# Patient Record
Sex: FEMALE | Race: WHITE | NOT HISPANIC OR LATINO | Employment: FULL TIME | URBAN - METROPOLITAN AREA
[De-identification: names, ages, dates, MRNs, and addresses within clinical notes are randomized per-mention and may not be internally consistent; named-entity substitution may affect disease eponyms.]

---

## 2022-08-23 PROBLEM — L03.114 CELLULITIS OF LEFT UPPER EXTREMITY: Status: ACTIVE | Noted: 2022-08-23

## 2022-08-23 PROBLEM — L92.3 TATTOO REACTION: Status: ACTIVE | Noted: 2022-08-23

## 2023-05-08 ENCOUNTER — HOSPITAL ENCOUNTER (EMERGENCY)
Facility: HOSPITAL | Age: 28
Discharge: HOME | End: 2023-05-08
Attending: EMERGENCY MEDICINE
Payer: MEDICAID

## 2023-05-08 VITALS
HEART RATE: 102 BPM | TEMPERATURE: 98 F | WEIGHT: 230 LBS | OXYGEN SATURATION: 100 % | BODY MASS INDEX: 42.33 KG/M2 | SYSTOLIC BLOOD PRESSURE: 108 MMHG | HEIGHT: 62 IN | DIASTOLIC BLOOD PRESSURE: 62 MMHG | RESPIRATION RATE: 18 BRPM

## 2023-05-08 DIAGNOSIS — E27.8 ADRENAL NODULE: ICD-10-CM

## 2023-05-08 DIAGNOSIS — K52.9 COLITIS: ICD-10-CM

## 2023-05-08 DIAGNOSIS — R10.84 GENERALIZED ABDOMINAL PAIN: Primary | ICD-10-CM

## 2023-05-08 LAB
ALBUMIN SERPL BCP-MCNC: 4 G/DL (ref 3.5–5.2)
ALP SERPL-CCNC: 85 U/L (ref 55–135)
ALT SERPL W/O P-5'-P-CCNC: 22 U/L (ref 10–44)
ANION GAP SERPL CALC-SCNC: 10 MMOL/L (ref 8–16)
AST SERPL-CCNC: 18 U/L (ref 10–40)
B-HCG UR QL: NEGATIVE
BACTERIA #/AREA URNS HPF: ABNORMAL /HPF
BASOPHILS # BLD AUTO: 0.01 K/UL (ref 0–0.2)
BASOPHILS NFR BLD: 0.1 % (ref 0–1.9)
BILIRUB SERPL-MCNC: 0.4 MG/DL (ref 0.1–1)
BILIRUB UR QL STRIP: NEGATIVE
BUN SERPL-MCNC: 15 MG/DL (ref 6–20)
CALCIUM SERPL-MCNC: 9.4 MG/DL (ref 8.7–10.5)
CHLORIDE SERPL-SCNC: 105 MMOL/L (ref 95–110)
CLARITY UR: ABNORMAL
CO2 SERPL-SCNC: 21 MMOL/L (ref 23–29)
COLOR UR: YELLOW
CREAT SERPL-MCNC: 0.9 MG/DL (ref 0.5–1.4)
DIFFERENTIAL METHOD: ABNORMAL
EOSINOPHIL # BLD AUTO: 0 K/UL (ref 0–0.5)
EOSINOPHIL NFR BLD: 0.2 % (ref 0–8)
ERYTHROCYTE [DISTWIDTH] IN BLOOD BY AUTOMATED COUNT: 12.6 % (ref 11.5–14.5)
EST. GFR  (NO RACE VARIABLE): >60 ML/MIN/1.73 M^2
GLUCOSE SERPL-MCNC: 100 MG/DL (ref 70–110)
GLUCOSE UR QL STRIP: NEGATIVE
HCT VFR BLD AUTO: 41.5 % (ref 37–48.5)
HCV AB SERPL QL IA: NORMAL
HGB BLD-MCNC: 13.5 G/DL (ref 12–16)
HGB UR QL STRIP: NEGATIVE
HYALINE CASTS #/AREA URNS LPF: 0 /LPF
IMM GRANULOCYTES # BLD AUTO: 0.04 K/UL (ref 0–0.04)
IMM GRANULOCYTES NFR BLD AUTO: 0.3 % (ref 0–0.5)
KETONES UR QL STRIP: NEGATIVE
LEUKOCYTE ESTERASE UR QL STRIP: NEGATIVE
LIPASE SERPL-CCNC: 34 U/L (ref 4–60)
LYMPHOCYTES # BLD AUTO: 1 K/UL (ref 1–4.8)
LYMPHOCYTES NFR BLD: 7.8 % (ref 18–48)
MCH RBC QN AUTO: 29.3 PG (ref 27–31)
MCHC RBC AUTO-ENTMCNC: 32.5 G/DL (ref 32–36)
MCV RBC AUTO: 90 FL (ref 82–98)
MICROSCOPIC COMMENT: ABNORMAL
MONOCYTES # BLD AUTO: 0.7 K/UL (ref 0.3–1)
MONOCYTES NFR BLD: 5.2 % (ref 4–15)
NEUTROPHILS # BLD AUTO: 11.2 K/UL (ref 1.8–7.7)
NEUTROPHILS NFR BLD: 86.4 % (ref 38–73)
NITRITE UR QL STRIP: NEGATIVE
NRBC BLD-RTO: 0 /100 WBC
PH UR STRIP: >8 [PH] (ref 5–8)
PLATELET # BLD AUTO: 226 K/UL (ref 150–450)
PMV BLD AUTO: 10.3 FL (ref 9.2–12.9)
POTASSIUM SERPL-SCNC: 4.1 MMOL/L (ref 3.5–5.1)
PROT SERPL-MCNC: 8 G/DL (ref 6–8.4)
PROT UR QL STRIP: ABNORMAL
RBC # BLD AUTO: 4.6 M/UL (ref 4–5.4)
RBC #/AREA URNS HPF: 0 /HPF (ref 0–4)
SODIUM SERPL-SCNC: 136 MMOL/L (ref 136–145)
SP GR UR STRIP: 1 (ref 1–1.03)
SQUAMOUS #/AREA URNS HPF: 5 /HPF
URN SPEC COLLECT METH UR: ABNORMAL
UROBILINOGEN UR STRIP-ACNC: NEGATIVE EU/DL
WBC # BLD AUTO: 12.95 K/UL (ref 3.9–12.7)
WBC #/AREA URNS HPF: 1 /HPF (ref 0–5)

## 2023-05-08 PROCEDURE — 99285 EMERGENCY DEPT VISIT HI MDM: CPT | Mod: 25

## 2023-05-08 PROCEDURE — 36415 COLL VENOUS BLD VENIPUNCTURE: CPT | Performed by: EMERGENCY MEDICINE

## 2023-05-08 PROCEDURE — 25000003 PHARM REV CODE 250: Performed by: EMERGENCY MEDICINE

## 2023-05-08 PROCEDURE — 83690 ASSAY OF LIPASE: CPT | Performed by: EMERGENCY MEDICINE

## 2023-05-08 PROCEDURE — 81000 URINALYSIS NONAUTO W/SCOPE: CPT | Performed by: EMERGENCY MEDICINE

## 2023-05-08 PROCEDURE — 85025 COMPLETE CBC W/AUTO DIFF WBC: CPT | Performed by: EMERGENCY MEDICINE

## 2023-05-08 PROCEDURE — 96361 HYDRATE IV INFUSION ADD-ON: CPT

## 2023-05-08 PROCEDURE — 80053 COMPREHEN METABOLIC PANEL: CPT | Performed by: EMERGENCY MEDICINE

## 2023-05-08 PROCEDURE — 87389 HIV-1 AG W/HIV-1&-2 AB AG IA: CPT | Performed by: EMERGENCY MEDICINE

## 2023-05-08 PROCEDURE — 86803 HEPATITIS C AB TEST: CPT | Performed by: EMERGENCY MEDICINE

## 2023-05-08 PROCEDURE — 96365 THER/PROPH/DIAG IV INF INIT: CPT | Mod: 59

## 2023-05-08 PROCEDURE — 25500020 PHARM REV CODE 255

## 2023-05-08 PROCEDURE — 81025 URINE PREGNANCY TEST: CPT | Performed by: EMERGENCY MEDICINE

## 2023-05-08 PROCEDURE — 63600175 PHARM REV CODE 636 W HCPCS: Performed by: EMERGENCY MEDICINE

## 2023-05-08 RX ORDER — ONDANSETRON 4 MG/1
4 TABLET, ORALLY DISINTEGRATING ORAL
Status: COMPLETED | OUTPATIENT
Start: 2023-05-08 | End: 2023-05-08

## 2023-05-08 RX ORDER — ACETAMINOPHEN 500 MG
1000 TABLET ORAL
Status: COMPLETED | OUTPATIENT
Start: 2023-05-08 | End: 2023-05-08

## 2023-05-08 RX ORDER — CIPROFLOXACIN 500 MG/1
500 TABLET ORAL 2 TIMES DAILY
Qty: 20 TABLET | Refills: 0 | Status: SHIPPED | OUTPATIENT
Start: 2023-05-08 | End: 2023-05-18

## 2023-05-08 RX ORDER — METRONIDAZOLE 500 MG/1
500 TABLET ORAL 3 TIMES DAILY
Qty: 21 TABLET | Refills: 0 | Status: SHIPPED | OUTPATIENT
Start: 2023-05-08 | End: 2023-05-15

## 2023-05-08 RX ORDER — CIPROFLOXACIN 2 MG/ML
400 INJECTION, SOLUTION INTRAVENOUS
Status: DISCONTINUED | OUTPATIENT
Start: 2023-05-08 | End: 2023-05-08

## 2023-05-08 RX ORDER — ONDANSETRON 4 MG/1
4 TABLET, ORALLY DISINTEGRATING ORAL EVERY 6 HOURS PRN
Qty: 12 TABLET | Refills: 0 | Status: SHIPPED | OUTPATIENT
Start: 2023-05-08 | End: 2023-12-01

## 2023-05-08 RX ADMIN — SODIUM CHLORIDE 1000 ML: 9 INJECTION, SOLUTION INTRAVENOUS at 05:05

## 2023-05-08 RX ADMIN — PIPERACILLIN AND TAZOBACTAM 4.5 G: 4; .5 INJECTION, POWDER, LYOPHILIZED, FOR SOLUTION INTRAVENOUS; PARENTERAL at 04:05

## 2023-05-08 RX ADMIN — SODIUM CHLORIDE 1000 ML: 9 INJECTION, SOLUTION INTRAVENOUS at 02:05

## 2023-05-08 RX ADMIN — ACETAMINOPHEN 1000 MG: 500 TABLET ORAL at 02:05

## 2023-05-08 RX ADMIN — IOHEXOL 100 ML: 350 INJECTION, SOLUTION INTRAVENOUS at 03:05

## 2023-05-08 RX ADMIN — ONDANSETRON 4 MG: 4 TABLET, ORALLY DISINTEGRATING ORAL at 07:05

## 2023-05-08 NOTE — DISCHARGE INSTRUCTIONS
Recommend you follow up for further outpatient management of adrenal nodule within 1 week with your PCP.

## 2023-05-08 NOTE — ED PROVIDER NOTES
Encounter Date: 2023       History     Chief Complaint   Patient presents with    Abdominal Pain     Started 2100 last night     Diarrhea    Chills     27-year-old female past medical history of hypertension presents today with lower abdominal pain and diarrhea x2 days.  Patient reports symptoms began last night with abdominal pain around 9:00 p.m..  She describes it as crampy lower middle abdominal pain.  Denies any dysuria hematuria.  Denies any blood in stool or black stools.  Denies any vomiting but reports some nausea.  She states she has some chills but denies any fever.  Denies any chest pain shortness breath cough runny nose congestion back pain or any other symptoms.  Patient states she is not pregnant.  Past surgical history of     The history is provided by the patient. No  was used.   Review of patient's allergies indicates:   Allergen Reactions    Nitrofurantoin monohyd/m-cryst      Past Medical History:   Diagnosis Date    Depression     Hypertension      Past Surgical History:   Procedure Laterality Date     SECTION      DILATION AND CURETTAGE OF UTERUS       No family history on file.  Social History     Tobacco Use    Smoking status: Never    Smokeless tobacco: Never   Substance Use Topics    Alcohol use: No    Drug use: No     Review of Systems   Constitutional:  Positive for chills. Negative for activity change, diaphoresis and fever.   HENT:  Negative for ear pain, rhinorrhea, sore throat and trouble swallowing.    Eyes:  Negative for pain and visual disturbance.   Respiratory:  Negative for cough, shortness of breath and stridor.    Cardiovascular:  Negative for chest pain.   Gastrointestinal:  Positive for abdominal pain, diarrhea and nausea. Negative for blood in stool and vomiting.   Genitourinary:  Negative for dysuria, hematuria, vaginal bleeding and vaginal discharge.   Musculoskeletal:  Negative for gait problem.   Skin:  Negative for rash and  wound.   Neurological:  Negative for seizures and headaches.   Psychiatric/Behavioral:  Negative for hallucinations and suicidal ideas.      Physical Exam     Initial Vitals [05/08/23 1309]   BP Pulse Resp Temp SpO2   (!) 145/90 (!) 118 20 99 °F (37.2 °C) 97 %      MAP       --         Physical Exam    Nursing note and vitals reviewed.  Constitutional: She appears well-developed. She is not diaphoretic. No distress.   HENT:   Head: Normocephalic and atraumatic.   Nose: Nose normal.   Eyes: EOM are normal. Pupils are equal, round, and reactive to light. No scleral icterus.   Neck: Neck supple.   Normal range of motion.  Cardiovascular:  Normal rate, regular rhythm, normal heart sounds and intact distal pulses.     Exam reveals no gallop and no friction rub.       No murmur heard.  Pulmonary/Chest: Breath sounds normal. No stridor. No respiratory distress. She has no wheezes. She has no rhonchi. She has no rales.   Abdominal: Abdomen is soft. Bowel sounds are normal. She exhibits no distension. There is no abdominal tenderness. There is no rebound and no guarding.   Musculoskeletal:         General: Normal range of motion.      Cervical back: Normal range of motion and neck supple.     Neurological: She is alert and oriented to person, place, and time. She has normal strength. No cranial nerve deficit or sensory deficit.   Skin: Skin is warm and dry. Capillary refill takes less than 2 seconds. No rash noted.   Psychiatric: She has a normal mood and affect.       ED Course   Procedures  Labs Reviewed   CBC W/ AUTO DIFFERENTIAL - Abnormal; Notable for the following components:       Result Value    WBC 12.95 (*)     Gran # (ANC) 11.2 (*)     Gran % 86.4 (*)     Lymph % 7.8 (*)     All other components within normal limits   COMPREHENSIVE METABOLIC PANEL - Abnormal; Notable for the following components:    CO2 21 (*)     All other components within normal limits   URINALYSIS, REFLEX TO URINE CULTURE - Abnormal; Notable  for the following components:    Appearance, UA Hazy (*)     pH, UA >8.0 (*)     Protein, UA 1+ (*)     All other components within normal limits    Narrative:     Specimen Source->Urine   URINALYSIS MICROSCOPIC - Abnormal; Notable for the following components:    Bacteria Few (*)     All other components within normal limits    Narrative:     Specimen Source->Urine   LIPASE   PREGNANCY TEST, URINE RAPID    Narrative:     Specimen Source->Urine   HIV 1 / 2 ANTIBODY   HEPATITIS C ANTIBODY          Imaging Results              CT Abdomen Pelvis With Contrast (Final result)  Result time 05/08/23 16:26:55      Final result by Mango Randolph MD (05/08/23 16:26:55)                   Impression:      1. There is apparent wall thickening of the right colon.  This could relate to inadequate distension.  Infectious or inflammatory colitis is also a consideration in the appropriate clinical setting.  2. Indeterminate left adrenal nodule.  This could be further characterized with elective adrenal mass protocol CT or MRI if warranted.  3. Heterogeneous density at several sites in the right hepatic lobe.  Steatosis is a consideration.      Electronically signed by: Mango Randolph  Date:    05/08/2023  Time:    16:26               Narrative:    EXAMINATION:  CT ABDOMEN PELVIS WITH CONTRAST    CLINICAL HISTORY:  Abdominal pain, acute, nonlocalized;    TECHNIQUE:  Low dose axial images, sagittal and coronal reformations were obtained from the lung bases to the pubic symphysis following the IV administration of 100 mL of Omnipaque 350 .  Oral contrast was not given.    COMPARISON:  None.    FINDINGS:  Lung bases are clear.  Normal size heart.  Unremarkable gallbladder.    There is a 2.3 cm left adrenal nodule Hounsfield unit 37.  There are subtle non masslike areas of decreased density at several sites in the right hepatic lobe.  These can be seen axial series 2, images 37 and 46.  Remaining solid abdominal organs are  unremarkable.    There is no enteric contrast which limits bowel assessment.  No dilated bowel loops.  There is apparent wall thickening along the ascending and proximal transverse colon segments.  Normal appendix.    Intrauterine device.  Unremarkable urinary bladder.    No acute osseous abnormality.                                       Medications   sodium chloride 0.9% bolus 1,000 mL 1,000 mL (0 mLs Intravenous Stopped 5/8/23 1526)   acetaminophen tablet 1,000 mg (1,000 mg Oral Given 5/8/23 1425)   iohexoL (OMNIPAQUE 350) 350 mg iodine/mL injection (100 mLs Intravenous Given 5/8/23 1537)   sodium chloride 0.9% bolus 1,000 mL 1,000 mL (0 mLs Intravenous Stopped 5/8/23 1820)   piperacillin-tazobactam (ZOSYN) 4.5 g in dextrose 5 % in water (D5W) 5 % 100 mL IVPB (MB+) (0 g Intravenous Stopped 5/8/23 1712)   ondansetron disintegrating tablet 4 mg (4 mg Oral Given 5/8/23 1924)     Medical Decision Making:   ED Management:  26 yo F presenting with lower abdominal pain consistent with prior diverticulitis or colitis. CT confirms colitis.  Do not suspect mesenteric ischemia.  I do not think emergent surgical intervention is indicated.  Pain is well controlled here.  Leukocytosis corresponding is consistent with inflammatory state.  There is no sign of abscess, perforation, or other acute complication.  Do not suspect C diff infection at this time given no recent antibiotics.  Joint decision making was made with patient and I will prescribe antibiotics for potential bacterial etiology. Discussed in detail black box warning and risks associated with fluoroquinolones.  Patient is aware of these risks and agrees to proceed with antibiotics.  We will plan on close outpatient PCP and GI follow-up for reassessment and I have review detailed return precautions. I also discussed all imaging results in detail with patient including her adrenal nodule which she plans to follow-up with closely palpation.             ED Course as of  05/08/23 2004   Mon May 08, 2023   1700 CT Abdomen Pelvis With Contrast [BD]   1701 CT Abdomen Pelvis With Contrast  Impression:     1. There is apparent wall thickening of the right colon.  This could relate to inadequate distension.  Infectious or inflammatory colitis is also a consideration in the appropriate clinical setting.  2. Indeterminate left adrenal nodule.  This could be further characterized with elective adrenal mass protocol CT or MRI if warranted.  3. Heterogeneous density at several sites in the right hepatic lobe.  Steatosis is a consideration.        Electronically signed by: Mango Randolph  Date:                                            05/08/2023  Time:                                           16:26 [BD]      ED Course User Index  [BD] Sedrick Berrios MD                 Clinical Impression:   Final diagnoses:  [R10.84] Generalized abdominal pain (Primary)  [E27.8] Adrenal nodule  [K52.9] Colitis        ED Disposition Condition    Discharge Stable          ED Prescriptions       Medication Sig Dispense Start Date End Date Auth. Provider    ciprofloxacin HCl (CIPRO) 500 MG tablet Take 1 tablet (500 mg total) by mouth 2 (two) times daily. for 10 days 20 tablet 5/8/2023 5/18/2023 Sedrick Berrios MD    metroNIDAZOLE (FLAGYL) 500 MG tablet Take 1 tablet (500 mg total) by mouth 3 (three) times daily. for 7 days 21 tablet 5/8/2023 5/15/2023 Sedrick Berrios MD    ondansetron (ZOFRAN-ODT) 4 MG TbDL Take 1 tablet (4 mg total) by mouth every 6 (six) hours as needed (nausea). 12 tablet 5/8/2023 -- Sedrick Berrios MD          Follow-up Information       Follow up With Specialties Details Why Contact Info    Melissa Gracia NP Family Medicine Go in 2 days  [address]  [facility] [address]  [phone]      [facility] - Emergency Dept Emergency Medicine Go to  As needed, If symptoms worsen [address]  [facility] [id number] [phone]             Sedrick Berrios MD  05/08/23  2004

## 2023-05-09 LAB — HIV 1+2 AB+HIV1 P24 AG SERPL QL IA: NORMAL

## 2023-05-27 ENCOUNTER — HOSPITAL ENCOUNTER (OUTPATIENT)
Facility: HOSPITAL | Age: 28
Discharge: HOME | DRG: 388 | End: 2023-05-29
Attending: INTERNAL MEDICINE | Admitting: INTERNAL MEDICINE
Payer: MEDICAID

## 2023-05-27 DIAGNOSIS — R07.9 CHEST PAIN: ICD-10-CM

## 2023-05-27 DIAGNOSIS — K56.600 PARTIAL SMALL BOWEL OBSTRUCTION: ICD-10-CM

## 2023-05-27 PROCEDURE — C9113 INJ PANTOPRAZOLE SODIUM, VIA: HCPCS | Performed by: INTERNAL MEDICINE

## 2023-05-27 PROCEDURE — G0379 DIRECT REFER HOSPITAL OBSERV: HCPCS

## 2023-05-27 PROCEDURE — 96367 TX/PROPH/DG ADDL SEQ IV INF: CPT

## 2023-05-27 PROCEDURE — G0378 HOSPITAL OBSERVATION PER HR: HCPCS

## 2023-05-27 PROCEDURE — 96365 THER/PROPH/DIAG IV INF INIT: CPT

## 2023-05-27 PROCEDURE — 12000002 HC ACUTE/MED SURGE SEMI-PRIVATE ROOM

## 2023-05-27 PROCEDURE — 96375 TX/PRO/DX INJ NEW DRUG ADDON: CPT

## 2023-05-27 PROCEDURE — 63600175 PHARM REV CODE 636 W HCPCS: Performed by: INTERNAL MEDICINE

## 2023-05-27 PROCEDURE — 96366 THER/PROPH/DIAG IV INF ADDON: CPT

## 2023-05-27 PROCEDURE — 25000003 PHARM REV CODE 250: Performed by: INTERNAL MEDICINE

## 2023-05-27 RX ORDER — HEPARIN SODIUM 5000 [USP'U]/ML
5000 INJECTION, SOLUTION INTRAVENOUS; SUBCUTANEOUS EVERY 8 HOURS
Status: DISCONTINUED | OUTPATIENT
Start: 2023-05-27 | End: 2023-05-29 | Stop reason: HOSPADM

## 2023-05-27 RX ORDER — SODIUM CHLORIDE 9 MG/ML
INJECTION, SOLUTION INTRAVENOUS CONTINUOUS
Status: DISCONTINUED | OUTPATIENT
Start: 2023-05-27 | End: 2023-05-27

## 2023-05-27 RX ORDER — IBUPROFEN 200 MG
24 TABLET ORAL
Status: DISCONTINUED | OUTPATIENT
Start: 2023-05-27 | End: 2023-05-29 | Stop reason: HOSPADM

## 2023-05-27 RX ORDER — ONDANSETRON 2 MG/ML
4 INJECTION INTRAMUSCULAR; INTRAVENOUS EVERY 4 HOURS PRN
Status: DISCONTINUED | OUTPATIENT
Start: 2023-05-27 | End: 2023-05-29 | Stop reason: HOSPADM

## 2023-05-27 RX ORDER — NALOXONE HCL 0.4 MG/ML
0.02 VIAL (ML) INJECTION
Status: DISCONTINUED | OUTPATIENT
Start: 2023-05-27 | End: 2023-05-29 | Stop reason: HOSPADM

## 2023-05-27 RX ORDER — PANTOPRAZOLE SODIUM 40 MG/10ML
40 INJECTION, POWDER, LYOPHILIZED, FOR SOLUTION INTRAVENOUS DAILY
Status: DISCONTINUED | OUTPATIENT
Start: 2023-05-27 | End: 2023-05-29 | Stop reason: HOSPADM

## 2023-05-27 RX ORDER — SODIUM CHLORIDE 0.9 % (FLUSH) 0.9 %
10 SYRINGE (ML) INJECTION EVERY 12 HOURS PRN
Status: DISCONTINUED | OUTPATIENT
Start: 2023-05-27 | End: 2023-05-29 | Stop reason: HOSPADM

## 2023-05-27 RX ORDER — HYDROMORPHONE HYDROCHLORIDE 1 MG/ML
0.5 INJECTION, SOLUTION INTRAMUSCULAR; INTRAVENOUS; SUBCUTANEOUS EVERY 6 HOURS PRN
Status: DISCONTINUED | OUTPATIENT
Start: 2023-05-27 | End: 2023-05-29 | Stop reason: HOSPADM

## 2023-05-27 RX ORDER — MAGNESIUM SULFATE 1 G/100ML
1 INJECTION INTRAVENOUS ONCE
Status: COMPLETED | OUTPATIENT
Start: 2023-05-27 | End: 2023-05-27

## 2023-05-27 RX ORDER — HYDROMORPHONE HYDROCHLORIDE 1 MG/ML
0.2 INJECTION, SOLUTION INTRAMUSCULAR; INTRAVENOUS; SUBCUTANEOUS EVERY 6 HOURS PRN
Status: DISCONTINUED | OUTPATIENT
Start: 2023-05-27 | End: 2023-05-29 | Stop reason: HOSPADM

## 2023-05-27 RX ORDER — GLUCAGON 1 MG
1 KIT INJECTION
Status: DISCONTINUED | OUTPATIENT
Start: 2023-05-27 | End: 2023-05-29 | Stop reason: HOSPADM

## 2023-05-27 RX ORDER — IBUPROFEN 200 MG
16 TABLET ORAL
Status: DISCONTINUED | OUTPATIENT
Start: 2023-05-27 | End: 2023-05-29 | Stop reason: HOSPADM

## 2023-05-27 RX ORDER — DEXTROSE, SODIUM CHLORIDE, SODIUM LACTATE, POTASSIUM CHLORIDE, AND CALCIUM CHLORIDE 5; .6; .31; .03; .02 G/100ML; G/100ML; G/100ML; G/100ML; G/100ML
INJECTION, SOLUTION INTRAVENOUS CONTINUOUS
Status: DISCONTINUED | OUTPATIENT
Start: 2023-05-27 | End: 2023-05-28

## 2023-05-27 RX ADMIN — PIPERACILLIN SODIUM AND TAZOBACTAM SODIUM 3.38 G: 3; .375 INJECTION, POWDER, LYOPHILIZED, FOR SOLUTION INTRAVENOUS at 08:05

## 2023-05-27 RX ADMIN — HYDROMORPHONE HYDROCHLORIDE 0.2 MG: 0.5 INJECTION, SOLUTION INTRAMUSCULAR; INTRAVENOUS; SUBCUTANEOUS at 08:05

## 2023-05-27 RX ADMIN — DEXTROSE, SODIUM CHLORIDE, SODIUM LACTATE, POTASSIUM CHLORIDE, AND CALCIUM CHLORIDE: 5; .6; .31; .03; .02 INJECTION, SOLUTION INTRAVENOUS at 05:05

## 2023-05-27 RX ADMIN — PANTOPRAZOLE SODIUM 40 MG: 40 INJECTION, POWDER, FOR SOLUTION INTRAVENOUS at 05:05

## 2023-05-27 RX ADMIN — MAGNESIUM SULFATE 1 G: 1 INJECTION INTRAVENOUS at 05:05

## 2023-05-27 NOTE — H&P
[facility]    History & Physical      Patient Name: Aisha Escobedo  MRN: [id number]  Admission Date: 5/27/2023  Attending Physician: Onur Mendez MD   Primary Care Provider: Melissa Gracia NP         Patient information was obtained from patient, past medical records, and ER records.     Subjective:     Principal Problem:Partial small bowel obstruction    Chief Complaint: No chief complaint on file.       HPI: 27-year-old female with a history of hypertension and depression previously seen in the [facility] Emergency Department on May 8 with abdominal pain, diarrhea, and chills.  Findings during that stay were consistent with colitis, and she was discharged with oral antibiotics.  Symptoms resolved, but she subsequently presented back to the [facility] Emergency Department on May 27 with mid to lower abdominal pain, fever, and nonbloody diarrhea.  No vomiting noted.  She was febrile, tachycardic, and had low blood pressure during the early part of her emergency department stay.  She received boluses of normal saline with improvement in heart rate and blood pressure.  On exam she was awake and alert with no respiratory symptoms.  She had mild generalized abdominal tenderness to palpation.  She was COVID positive in the emergency department.  CT of the abdomen and pelvis showed evidence of possible ileus versus small-bowel obstruction, but the previously noted colon wall thickening appeared resolved.  She had no vomiting in the emergency department.  She received IV Zosyn.  Requesting transfer to Hospital Medicine at [facility] in COVID isolation status for evaluation of ileus/small-bowel obstruction along with acute febrile illness. Blood pressure fluctuated during her ED stay. With boluses of NS, BP improved.     COVID positive, influenza negative  White blood cells 10.78, hemoglobin 12.3, hematocrit 39.1, platelets 229, lactic acid  1, INR 1, magnesium 1.8, lipase 27, urine pregnancy test negative, sodium 135, potassium 4.3, chloride 102, CO2 25, BUN 14, creatinine 1, glucose 104, AST 20, ALT 28  Urinalysis with trace occult blood, 2+ leukocytes, 1 RBC, 6, WBC, rare bacteria, 8 squamous epithelial cells  Blood cultures ordered     CT abdomen and pelvis showed dilatation of the small bowel of the left upper quadrant up to 3.6 cm with a transition and normal small bowel in the upper mid abdomen.  Constellation of findings nonspecific and could represent focal ileus versus early/partial small bowel obstruction.  Interval resolution of the colonic wall thickening seen on May 8.  Indeterminate left adrenal nodule.     EKG showed sinus tachycardia with ventricular rate 110.  Otherwise normal ECG.     May 8: CT abdomen and pelvis noted apparent wall thickening of the right colon.  Indeterminate left adrenal nodule.  Heterogeneous density at several sites in the right hepatic lobe.  Steatosis is a consideration.    Patient transferred to [facility] for availability of surgery, GI.    Personally complaining of some abdominal pain, asking to eat.    Past Medical History:   Diagnosis Date    colitis     Depression     Hypertension        Past Surgical History:   Procedure Laterality Date     SECTION      DILATION AND CURETTAGE OF UTERUS         Review of patient's allergies indicates:   Allergen Reactions    Nitrofurantoin monohyd/m-cryst        Current Facility-Administered Medications on File Prior to Encounter   Medication    [COMPLETED] 0.9%  NaCl infusion    [COMPLETED] acetaminophen tablet 1,000 mg    [COMPLETED] aluminum-magnesium hydroxide-simethicone 200-200-20 mg/5 mL suspension 30 mL    [COMPLETED] dicyclomine injection 20 mg    [COMPLETED] iohexoL (OMNIPAQUE 350) injection 100 mL    [COMPLETED] morphine injection 4 mg    [COMPLETED] ondansetron injection 8 mg    [COMPLETED] piperacillin-tazobactam (ZOSYN) 4.5 g in dextrose 5 % in water  (D5W) 5 % 100 mL IVPB (MB+)    [COMPLETED] remdesivir 200 mg in sodium chloride 0.9% 250 mL infusion    [COMPLETED] sodium chloride 0.9% bolus 1,000 mL 1,000 mL    [COMPLETED] sodium chloride 0.9% bolus 1,000 mL 1,000 mL    [COMPLETED] sodium chloride 0.9% bolus 1,503 mL 1,503 mL    [DISCONTINUED] LIDOcaine HCl 2% oral solution 15 mL    [DISCONTINUED] remdesivir 100 mg in sodium chloride 0.9% 250 mL infusion     Current Outpatient Medications on File Prior to Encounter   Medication Sig    butalbital-acetaminophen-caffeine -40 mg (FIORICET, ESGIC) -40 mg per tablet Take 1 tablet by mouth every 4 (four) hours as needed.    cetirizine (ZYRTEC) 10 MG tablet Take 1 tablet (10 mg total) by mouth once daily.    diclofenac (VOLTAREN) 75 MG EC tablet Take 1 tablet (75 mg total) by mouth 2 (two) times daily.    escitalopram oxalate (LEXAPRO) 20 MG tablet Take 20 mg by mouth once daily.    HYDROcodone-acetaminophen (NORCO) 5-325 mg per tablet Take 1 tablet by mouth every 4 (four) hours as needed for Pain.    lisinopril (PRINIVIL,ZESTRIL) 40 MG tablet lisinopril 40 mg tablet   Take 1 tablet every day by oral route.    ondansetron (ZOFRAN) 4 MG tablet Take 1 tablet (4 mg total) by mouth every 6 (six) hours.    ondansetron (ZOFRAN-ODT) 4 MG TbDL Take 1 tablet (4 mg total) by mouth every 6 (six) hours as needed (nausea).    promethazine (PHENERGAN) 25 MG tablet Take 1 tablet (25 mg total) by mouth every 6 (six) hours as needed for Nausea.     Family History    None       Tobacco Use    Smoking status: Never    Smokeless tobacco: Never   Substance and Sexual Activity    Alcohol use: No    Drug use: No    Sexual activity: Not on file     Review of Systems  Objective:     Vital Signs (Most Recent):    Vital Signs (24h Range):  Temp:  [97.5 °F (36.4 °C)-102.8 °F (39.3 °C)] 97.5 °F (36.4 °C)  Pulse:  [] 87  Resp:  [18-27] 19  SpO2:  [96 %-100 %] 100 %  BP: ()/(50-78) 100/57        There is no height or weight on  file to calculate BMI.    Physical Exam  Constitutional:       Appearance: Normal appearance.   HENT:      Head: Normocephalic.      Right Ear: External ear normal.      Left Ear: External ear normal.      Nose: Nose normal.      Mouth/Throat:      Mouth: Mucous membranes are moist.   Eyes:      Extraocular Movements: Extraocular movements intact.      Pupils: Pupils are equal, round, and reactive to light.   Cardiovascular:      Rate and Rhythm: Normal rate and regular rhythm.   Pulmonary:      Effort: Pulmonary effort is normal.      Breath sounds: Normal breath sounds.   Abdominal:      Tenderness: There is abdominal tenderness. There is no guarding or rebound.      Comments: Mild-moderately tender diffusely, mostly at the left and right abdominal areas as well as epigastrium   Skin:     General: Skin is warm.      Capillary Refill: Capillary refill takes less than 2 seconds.   Neurological:      General: No focal deficit present.      Mental Status: She is alert and oriented to person, place, and time.   Psychiatric:         Mood and Affect: Mood normal.         Behavior: Behavior normal.         CRANIAL NERVES     CN III, IV, VI   Pupils are equal, round, and reactive to light.    Significant Labs: All pertinent labs within the past 24 hours have been reviewed.    Significant Imaging: I have reviewed all pertinent imaging results/findings within the past 24 hours.    Assessment/Plan:     Active Diagnoses:    Diagnosis Date Noted POA    PRINCIPAL PROBLEM:  Partial small bowel obstruction [K56.600] 05/27/2023 Unknown      Problems Resolved During this Admission:     VTE Risk Mitigation (From admission, onward)           Ordered     heparin (porcine) injection 5,000 Units  Every 8 hours         05/27/23 1453     IP VTE HIGH RISK PATIENT  Once         05/27/23 1453     Place sequential compression device  Until discontinued         05/27/23 1453                  Abdominal pain, diarrhea, chills  Likely infectious  colitis, possibly purely related to COVID in unvaccinated pt  Hypotension/dehydration  -received remdesivir 1 time dose in the ER at, will continue   -f/u stool and Bcx, UCx  -serial abd exams  -empiric zosyn  -NPO  -D5NS  -pain control, antiemetics  -KUB in a.m.  -consider surgery consult if not improving  -HSQ, PPI     left adrenal nodule-OP f/u    Onur Mendez MD  Department of Hospital Medicine   [facility]

## 2023-05-28 LAB
ANION GAP SERPL CALC-SCNC: 6 MMOL/L (ref 8–16)
BASOPHILS # BLD AUTO: 0.01 K/UL (ref 0–0.2)
BASOPHILS NFR BLD: 0.2 % (ref 0–1.9)
BUN SERPL-MCNC: 9 MG/DL (ref 6–20)
CALCIUM SERPL-MCNC: 7.7 MG/DL (ref 8.7–10.5)
CHLORIDE SERPL-SCNC: 106 MMOL/L (ref 95–110)
CO2 SERPL-SCNC: 24 MMOL/L (ref 23–29)
CREAT SERPL-MCNC: 0.9 MG/DL (ref 0.5–1.4)
DIFFERENTIAL METHOD: ABNORMAL
EOSINOPHIL # BLD AUTO: 0 K/UL (ref 0–0.5)
EOSINOPHIL NFR BLD: 0.5 % (ref 0–8)
ERYTHROCYTE [DISTWIDTH] IN BLOOD BY AUTOMATED COUNT: 13.1 % (ref 11.5–14.5)
EST. GFR  (NO RACE VARIABLE): >60 ML/MIN/1.73 M^2
GLUCOSE SERPL-MCNC: 100 MG/DL (ref 70–110)
HCT VFR BLD AUTO: 33 % (ref 37–48.5)
HGB BLD-MCNC: 10.6 G/DL (ref 12–16)
IMM GRANULOCYTES # BLD AUTO: 0.03 K/UL (ref 0–0.04)
IMM GRANULOCYTES NFR BLD AUTO: 0.5 % (ref 0–0.5)
LYMPHOCYTES # BLD AUTO: 1.5 K/UL (ref 1–4.8)
LYMPHOCYTES NFR BLD: 22.3 % (ref 18–48)
MAGNESIUM SERPL-MCNC: 2 MG/DL (ref 1.6–2.6)
MCH RBC QN AUTO: 29.1 PG (ref 27–31)
MCHC RBC AUTO-ENTMCNC: 32.1 G/DL (ref 32–36)
MCV RBC AUTO: 91 FL (ref 82–98)
MONOCYTES # BLD AUTO: 0.6 K/UL (ref 0.3–1)
MONOCYTES NFR BLD: 9.6 % (ref 4–15)
NEUTROPHILS # BLD AUTO: 4.4 K/UL (ref 1.8–7.7)
NEUTROPHILS NFR BLD: 66.9 % (ref 38–73)
NRBC BLD-RTO: 0 /100 WBC
PLATELET # BLD AUTO: 198 K/UL (ref 150–450)
PMV BLD AUTO: 10.8 FL (ref 9.2–12.9)
POTASSIUM SERPL-SCNC: 3.8 MMOL/L (ref 3.5–5.1)
RBC # BLD AUTO: 3.64 M/UL (ref 4–5.4)
SODIUM SERPL-SCNC: 136 MMOL/L (ref 136–145)
WBC # BLD AUTO: 6.55 K/UL (ref 3.9–12.7)

## 2023-05-28 PROCEDURE — 96366 THER/PROPH/DIAG IV INF ADDON: CPT

## 2023-05-28 PROCEDURE — 63600175 PHARM REV CODE 636 W HCPCS: Performed by: INTERNAL MEDICINE

## 2023-05-28 PROCEDURE — 96372 THER/PROPH/DIAG INJ SC/IM: CPT | Performed by: INTERNAL MEDICINE

## 2023-05-28 PROCEDURE — C9113 INJ PANTOPRAZOLE SODIUM, VIA: HCPCS | Performed by: INTERNAL MEDICINE

## 2023-05-28 PROCEDURE — 25000003 PHARM REV CODE 250: Performed by: INTERNAL MEDICINE

## 2023-05-28 PROCEDURE — 96367 TX/PROPH/DG ADDL SEQ IV INF: CPT

## 2023-05-28 PROCEDURE — 96376 TX/PRO/DX INJ SAME DRUG ADON: CPT

## 2023-05-28 PROCEDURE — 12000002 HC ACUTE/MED SURGE SEMI-PRIVATE ROOM

## 2023-05-28 PROCEDURE — 83735 ASSAY OF MAGNESIUM: CPT | Performed by: INTERNAL MEDICINE

## 2023-05-28 PROCEDURE — G0378 HOSPITAL OBSERVATION PER HR: HCPCS

## 2023-05-28 PROCEDURE — 85025 COMPLETE CBC W/AUTO DIFF WBC: CPT | Performed by: INTERNAL MEDICINE

## 2023-05-28 PROCEDURE — 80048 BASIC METABOLIC PNL TOTAL CA: CPT | Performed by: INTERNAL MEDICINE

## 2023-05-28 PROCEDURE — 36415 COLL VENOUS BLD VENIPUNCTURE: CPT | Performed by: INTERNAL MEDICINE

## 2023-05-28 PROCEDURE — 96375 TX/PRO/DX INJ NEW DRUG ADDON: CPT

## 2023-05-28 RX ORDER — SODIUM CHLORIDE, SODIUM LACTATE, POTASSIUM CHLORIDE, CALCIUM CHLORIDE 600; 310; 30; 20 MG/100ML; MG/100ML; MG/100ML; MG/100ML
INJECTION, SOLUTION INTRAVENOUS CONTINUOUS
Status: DISCONTINUED | OUTPATIENT
Start: 2023-05-28 | End: 2023-05-29 | Stop reason: HOSPADM

## 2023-05-28 RX ADMIN — PIPERACILLIN SODIUM AND TAZOBACTAM SODIUM 3.38 G: 3; .375 INJECTION, POWDER, LYOPHILIZED, FOR SOLUTION INTRAVENOUS at 09:05

## 2023-05-28 RX ADMIN — DEXTROSE, SODIUM CHLORIDE, SODIUM LACTATE, POTASSIUM CHLORIDE, AND CALCIUM CHLORIDE: 5; .6; .31; .03; .02 INJECTION, SOLUTION INTRAVENOUS at 08:05

## 2023-05-28 RX ADMIN — PIPERACILLIN SODIUM AND TAZOBACTAM SODIUM 3.38 G: 3; .375 INJECTION, POWDER, LYOPHILIZED, FOR SOLUTION INTRAVENOUS at 12:05

## 2023-05-28 RX ADMIN — REMDESIVIR 100 MG: 100 INJECTION, POWDER, LYOPHILIZED, FOR SOLUTION INTRAVENOUS at 05:05

## 2023-05-28 RX ADMIN — PIPERACILLIN SODIUM AND TAZOBACTAM SODIUM 3.38 G: 3; .375 INJECTION, POWDER, LYOPHILIZED, FOR SOLUTION INTRAVENOUS at 05:05

## 2023-05-28 RX ADMIN — SODIUM CHLORIDE, SODIUM LACTATE, POTASSIUM CHLORIDE, AND CALCIUM CHLORIDE: .6; .31; .03; .02 INJECTION, SOLUTION INTRAVENOUS at 01:05

## 2023-05-28 RX ADMIN — HEPARIN SODIUM 5000 UNITS: 5000 INJECTION, SOLUTION INTRAVENOUS; SUBCUTANEOUS at 01:05

## 2023-05-28 RX ADMIN — PANTOPRAZOLE SODIUM 40 MG: 40 INJECTION, POWDER, FOR SOLUTION INTRAVENOUS at 05:05

## 2023-05-28 NOTE — PLAN OF CARE
Problem: Adult Inpatient Plan of Care  Goal: Patient-Specific Goal (Individualized)  Outcome: Ongoing, Progressing  Goal: Absence of Hospital-Acquired Illness or Injury  Outcome: Ongoing, Progressing  Goal: Optimal Comfort and Wellbeing  Outcome: Ongoing, Progressing     Problem: Infection  Goal: Absence of Infection Signs and Symptoms  Outcome: Ongoing, Progressing     Problem: Activity Intolerance (Pulmonary Impairment)  Goal: Improved Activity Tolerance  Outcome: Ongoing, Progressing     Problem: Airway Clearance Ineffective (Pulmonary Impairment)  Goal: Effective Airway Clearance  Outcome: Ongoing, Progressing     Problem: Gas Exchange Impaired (Pulmonary Impairment)  Goal: Optimal Gas Exchange  Outcome: Ongoing, Progressing

## 2023-05-28 NOTE — PROGRESS NOTES
[facility]  Progress Note    Patient Name: Aisha Escobedo  MRN: [id number]  Patient Class: IP- Inpatient   Admission Date: 5/27/2023  Length of Stay: 1 days  Attending Physician: Onur Mendez MD  Primary Care Provider: Melissa Gracia NP        Subjective:     Principal Problem:Partial small bowel obstruction    Interval History:  Some abdominal discomfort, SBO on KUB, has bowel sounds.  Symptomatically improving.  Advance to full liquid diet.    Review of Systems  Objective:     Vital Signs (Most Recent):  Temp: 97.6 °F (36.4 °C) (05/28/23 1151)  Pulse: 85 (05/28/23 1151)  Resp: 18 (05/28/23 1151)  BP: 99/62 (05/28/23 1151)  SpO2: 100 % (05/28/23 1151) Vital Signs (24h Range):  Temp:  [97.1 °F (36.2 °C)-98.8 °F (37.1 °C)] 97.6 °F (36.4 °C)  Pulse:  [76-92] 85  Resp:  [16-19] 18  SpO2:  [97 %-100 %] 100 %  BP: ()/(51-81) 99/62     Weight: 102.1 kg (225 lb)  Body mass index is 41.15 kg/m².    Intake/Output Summary (Last 24 hours) at 5/28/2023 1317  Last data filed at 5/27/2023 2041  Gross per 24 hour   Intake 400 ml   Output --   Net 400 ml      Physical Exam  Physical Exam  Constitutional:       Appearance: Normal appearance.   HENT:      Head: Normocephalic.      Right Ear: External ear normal.      Left Ear: External ear normal.      Nose: Nose normal.      Mouth/Throat:      Mouth: Mucous membranes are moist.   Eyes:      Extraocular Movements: Extraocular movements intact.      Pupils: Pupils are equal, round, and reactive to light.   Cardiovascular:      Rate and Rhythm: Normal rate and regular rhythm.   Pulmonary:      Effort: Pulmonary effort is normal.      Breath sounds: Normal breath sounds.   Abdominal:      Tenderness: There is abdominal tenderness. There is no guarding or rebound.      Comments: Mildly tender diffusely, mostly at the left and right abdominal areas as well as epigastrium   Skin:     General: Skin is warm.      Capillary  Refill: Capillary refill takes less than 2 seconds.   Neurological:      General: No focal deficit present.      Mental Status: She is alert and oriented to person, place, and time.   Psychiatric:         Mood and Affect: Mood normal.         Behavior: Behavior normal.         Significant Labs: All pertinent labs within the past 24 hours have been reviewed.    Significant Imaging: I have reviewed all pertinent imaging results/findings within the past 24 hours.    Assessment/Plan:      Active Diagnoses:    Diagnosis Date Noted POA    PRINCIPAL PROBLEM:  Partial small bowel obstruction [K56.600] 05/27/2023 Yes      Problems Resolved During this Admission:     VTE Risk Mitigation (From admission, onward)           Ordered     heparin (porcine) injection 5,000 Units  Every 8 hours         05/27/23 1453     IP VTE HIGH RISK PATIENT  Once         05/27/23 1453     Place sequential compression device  Until discontinued         05/27/23 1453                            Abdominal pain, diarrhea, chills  Likely infectious colitis, possibly purely related to COVID in unvaccinated pt  Hypotension/dehydration  -received remdesivir 1 time dose in the ER at, will continue   -f/u stool and Bcx, UCx  -serial abd exams  -empiric zosyn  -NPO  -D5NS --> LR  -advance to FLD  -pain control, antiemetics  -consider surgery consult if not improving  -HSQ, PPI      left adrenal nodule-OP f/u       Onur Mendez MD  Department of Hospital Medicine   [facility]

## 2023-05-28 NOTE — NURSING
0700: report received, in bed sleeping, no distress  1815: uneventful shift, had 2 small bowel movements, tolerating diet and advanced to soft, pleasant and cooperative.

## 2023-05-28 NOTE — PLAN OF CARE
[facility]  Initial Discharge Assessment       Primary Care Provider: Melissa Gracia NP    Admission Diagnosis: Partial small bowel obstruction [K56.600]    Admission Date: 5/27/2023  Expected Discharge Date:     Transition of Care Barriers: None    Payor: MEDICAID / Plan: [facility] COMMUNITY PLAN [employer] ([place] MEDICAID) / Product Type: Managed Medicaid /     Extended Emergency Contact Information  Primary Emergency Contact: GreggMorganKristine  Address: [address] [place] [address] [place] [address] [facility]  Home Phone: [phone]  Mobile Phone: [phone]  Relation: Mother    Discharge Plan A: Home  Discharge Plan B: Home      [facility] - [place], [place] - [address]  [address] [address]  Phone: [phone] Fax: [phone]    CM met with Pt at bedside to complete discharge assessment. Pt AAOx4s. Demographics, PCP, and insurance verified. No home health. No dialysis. Pt reports ability to complete ADLs without assistance. Pt verbalized plan to discharge home via family transport. Pt has no other needs to be addressed at this time.     Initial Assessment (most recent)       Adult Discharge Assessment - 05/28/23 1418          Discharge Assessment    Assessment Type Discharge Planning Assessment     Confirmed/corrected address, phone number and insurance Yes     Confirmed Demographics Correct on Facesheet     Source of Information patient     When was your last doctors appointment? --   1 mo ago    Communicated NGA with patient/caregiver Date not available/Unable to determine     Reason For Admission Abdominal Pain, Diarrhea     People in Home child(jimmy), dependent     Do you expect to return to your current living situation? Yes     Do you have help at home or someone to help you manage your care at home? Yes     Who are your caregiver(s) and their phone number(s)? Kristine Escobedo (mother) [phone]     Prior to hospitilization  cognitive status: Alert/Oriented     Current cognitive status: Alert/Oriented     Home Layout Able to live on 1st floor     Equipment Currently Used at Home none     Readmission within 30 days? No     Patient currently being followed by outpatient case management? No     Do you currently have service(s) that help you manage your care at home? No     Do you take prescription medications? Yes     Do you have prescription coverage? Yes     Coverage Critical access hospital Care     Do you have any problems affording any of your prescribed medications? No     Is the patient taking medications as prescribed? yes     Who is going to help you get home at discharge? Family     How do you get to doctors appointments? car, drives self;family or friend will provide     Are you on dialysis? No     Do you take coumadin? No     Discharge Plan A Home     Discharge Plan B Home     DME Needed Upon Discharge  none     Discharge Plan discussed with: Patient     Transition of Care Barriers None

## 2023-05-29 VITALS
HEART RATE: 75 BPM | WEIGHT: 225 LBS | RESPIRATION RATE: 18 BRPM | SYSTOLIC BLOOD PRESSURE: 116 MMHG | TEMPERATURE: 98 F | HEIGHT: 62 IN | BODY MASS INDEX: 41.41 KG/M2 | DIASTOLIC BLOOD PRESSURE: 78 MMHG | OXYGEN SATURATION: 96 %

## 2023-05-29 LAB
ANION GAP SERPL CALC-SCNC: 6 MMOL/L (ref 8–16)
BASOPHILS # BLD AUTO: 0.01 K/UL (ref 0–0.2)
BASOPHILS NFR BLD: 0.2 % (ref 0–1.9)
BUN SERPL-MCNC: 9 MG/DL (ref 6–20)
CALCIUM SERPL-MCNC: 8.5 MG/DL (ref 8.7–10.5)
CHLORIDE SERPL-SCNC: 106 MMOL/L (ref 95–110)
CO2 SERPL-SCNC: 26 MMOL/L (ref 23–29)
CREAT SERPL-MCNC: 0.9 MG/DL (ref 0.5–1.4)
DIFFERENTIAL METHOD: ABNORMAL
EOSINOPHIL # BLD AUTO: 0.1 K/UL (ref 0–0.5)
EOSINOPHIL NFR BLD: 2 % (ref 0–8)
ERYTHROCYTE [DISTWIDTH] IN BLOOD BY AUTOMATED COUNT: 12.9 % (ref 11.5–14.5)
EST. GFR  (NO RACE VARIABLE): >60 ML/MIN/1.73 M^2
GLUCOSE SERPL-MCNC: 95 MG/DL (ref 70–110)
HCT VFR BLD AUTO: 35.4 % (ref 37–48.5)
HGB BLD-MCNC: 11.3 G/DL (ref 12–16)
IMM GRANULOCYTES # BLD AUTO: 0.02 K/UL (ref 0–0.04)
IMM GRANULOCYTES NFR BLD AUTO: 0.4 % (ref 0–0.5)
LYMPHOCYTES # BLD AUTO: 1.5 K/UL (ref 1–4.8)
LYMPHOCYTES NFR BLD: 33.8 % (ref 18–48)
MAGNESIUM SERPL-MCNC: 2 MG/DL (ref 1.6–2.6)
MCH RBC QN AUTO: 28.8 PG (ref 27–31)
MCHC RBC AUTO-ENTMCNC: 31.9 G/DL (ref 32–36)
MCV RBC AUTO: 90 FL (ref 82–98)
MONOCYTES # BLD AUTO: 0.4 K/UL (ref 0.3–1)
MONOCYTES NFR BLD: 8.8 % (ref 4–15)
NEUTROPHILS # BLD AUTO: 2.5 K/UL (ref 1.8–7.7)
NEUTROPHILS NFR BLD: 54.8 % (ref 38–73)
NRBC BLD-RTO: 0 /100 WBC
PLATELET # BLD AUTO: 212 K/UL (ref 150–450)
PMV BLD AUTO: 10.5 FL (ref 9.2–12.9)
POTASSIUM SERPL-SCNC: 4 MMOL/L (ref 3.5–5.1)
RBC # BLD AUTO: 3.92 M/UL (ref 4–5.4)
SODIUM SERPL-SCNC: 138 MMOL/L (ref 136–145)
VIT B12 SERPL-MCNC: 684 PG/ML (ref 210–950)
WBC # BLD AUTO: 4.53 K/UL (ref 3.9–12.7)

## 2023-05-29 PROCEDURE — 80048 BASIC METABOLIC PNL TOTAL CA: CPT | Performed by: INTERNAL MEDICINE

## 2023-05-29 PROCEDURE — 82607 VITAMIN B-12: CPT | Performed by: INTERNAL MEDICINE

## 2023-05-29 PROCEDURE — G0378 HOSPITAL OBSERVATION PER HR: HCPCS

## 2023-05-29 PROCEDURE — 85025 COMPLETE CBC W/AUTO DIFF WBC: CPT | Performed by: INTERNAL MEDICINE

## 2023-05-29 PROCEDURE — 96366 THER/PROPH/DIAG IV INF ADDON: CPT

## 2023-05-29 PROCEDURE — 36415 COLL VENOUS BLD VENIPUNCTURE: CPT | Performed by: INTERNAL MEDICINE

## 2023-05-29 PROCEDURE — 25000003 PHARM REV CODE 250: Performed by: INTERNAL MEDICINE

## 2023-05-29 PROCEDURE — 63600175 PHARM REV CODE 636 W HCPCS: Performed by: INTERNAL MEDICINE

## 2023-05-29 PROCEDURE — 83735 ASSAY OF MAGNESIUM: CPT | Performed by: INTERNAL MEDICINE

## 2023-05-29 RX ORDER — NIRMATRELVIR AND RITONAVIR 300-100 MG
KIT ORAL
Qty: 30 TABLET | Refills: 0 | Status: SHIPPED | OUTPATIENT
Start: 2023-05-29 | End: 2023-06-03

## 2023-05-29 RX ADMIN — REMDESIVIR 100 MG: 100 INJECTION, POWDER, LYOPHILIZED, FOR SOLUTION INTRAVENOUS at 10:05

## 2023-05-29 RX ADMIN — PIPERACILLIN SODIUM AND TAZOBACTAM SODIUM 3.38 G: 3; .375 INJECTION, POWDER, LYOPHILIZED, FOR SOLUTION INTRAVENOUS at 06:05

## 2023-05-29 NOTE — PLAN OF CARE
Problem: Adult Inpatient Plan of Care  Goal: Patient-Specific Goal (Individualized)  Outcome: Ongoing, Progressing  Goal: Absence of Hospital-Acquired Illness or Injury  Outcome: Ongoing, Progressing  Goal: Readiness for Transition of Care  Outcome: Ongoing, Progressing     Problem: Bariatric Environmental Safety  Goal: Safety Maintained with Care  Outcome: Ongoing, Progressing     Problem: Infection  Goal: Absence of Infection Signs and Symptoms  Outcome: Ongoing, Progressing     Problem: Activity Intolerance (Pulmonary Impairment)  Goal: Improved Activity Tolerance  Outcome: Ongoing, Progressing     Problem: Airway Clearance Ineffective (Pulmonary Impairment)  Goal: Effective Airway Clearance  Outcome: Ongoing, Progressing     Problem: Gas Exchange Impaired (Pulmonary Impairment)  Goal: Optimal Gas Exchange  Outcome: Ongoing, Progressing

## 2023-05-29 NOTE — NURSING
0700: report received, in bed with no distress or complaints  1200: removed saline lock, went over all discharge instructions and answered all questions, trying to secure a ride, instructed her to call when her ride is here and we will roll her to the front entrance  1300: patient walked to desk with no mask and asked how to get to the front, escorted out with a tech.

## 2023-05-29 NOTE — DISCHARGE INSTRUCTIONS
Follow up outpatient with your primary care physician in 10 days.  Self quarantine for 10 days after symptom onset.  [place] fluid intake, maintain hydration.  Return to hospital if symptoms worsen.  Return to hospital if shortness of breath occurs.

## 2023-05-29 NOTE — PLAN OF CARE
DC orders and chart reviewed. No discharge needs noted.  Patient cleared for discharge from .  Patient is discharging to home.   attempted to schedule follow up appointment, but PCP office closed for Memorial Day.  Patient notified to schedule follow up appointment.     05/29/23 1132   Final Note   Assessment Type Final Discharge Note   Anticipated Discharge Disposition Home   What phone number can be called within the next 1-3 days to see how you are doing after discharge? [phone]   Hospital Resources/Appts/Education Provided Provided patient/caregiver with written discharge plan information   Post-Acute Status   Discharge Delays None known at this time

## 2023-05-29 NOTE — DISCHARGE SUMMARY
[facility]  Discharge Summary      Patient Name: Aisha Escobedo  MRN: [id number]  Admission Date: 5/27/2023  Hospital Length of Stay: 2 days  Discharge Date and Time: No discharge date for patient encounter.  Attending Physician: Onur Mendez MD   Discharging Provider: Onur Mendez MD  Primary Care Provider: Melissa Gracia NP        HPI: 27-year-old female with a history of hypertension and depression previously seen in the [facility] Emergency Department on May 8 with abdominal pain, diarrhea, and chills.  Findings during that stay were consistent with colitis, and she was discharged with oral antibiotics.  Symptoms resolved, but she subsequently presented back to the [facility] Emergency Department on May 27 with mid to lower abdominal pain, fever, and nonbloody diarrhea.  No vomiting noted.  She was febrile, tachycardic, and had low blood pressure during the early part of her emergency department stay.  She received boluses of normal saline with improvement in heart rate and blood pressure.  On exam she was awake and alert with no respiratory symptoms.  She had mild generalized abdominal tenderness to palpation.  She was COVID positive in the emergency department.  CT of the abdomen and pelvis showed evidence of possible ileus versus small-bowel obstruction, but the previously noted colon wall thickening appeared resolved.  She had no vomiting in the emergency department.  She received IV Zosyn.  Requesting transfer to Hospital Medicine at [facility] in COVID isolation status for evaluation of ileus/small-bowel obstruction along with acute febrile illness. Blood pressure fluctuated during her ED stay. With boluses of NS, BP improved.     COVID positive, influenza negative  White blood cells 10.78, hemoglobin 12.3, hematocrit 39.1, platelets 229, lactic acid 1, INR 1, magnesium 1.8, lipase 27, urine  pregnancy test negative, sodium 135, potassium 4.3, chloride 102, CO2 25, BUN 14, creatinine 1, glucose 104, AST 20, ALT 28  Urinalysis with trace occult blood, 2+ leukocytes, 1 RBC, 6, WBC, rare bacteria, 8 squamous epithelial cells  Blood cultures ordered     CT abdomen and pelvis showed dilatation of the small bowel of the left upper quadrant up to 3.6 cm with a transition and normal small bowel in the upper mid abdomen.  Constellation of findings nonspecific and could represent focal ileus versus early/partial small bowel obstruction.  Interval resolution of the colonic wall thickening seen on May 8.  Indeterminate left adrenal nodule.     EKG showed sinus tachycardia with ventricular rate 110.  Otherwise normal ECG.     May 8: CT abdomen and pelvis noted apparent wall thickening of the right colon.  Indeterminate left adrenal nodule.  Heterogeneous density at several sites in the right hepatic lobe.  Steatosis is a consideration.     Patient transferred to [facility] for availability of surgery, GI.     Personally complaining of some abdominal pain, asking to eat.       * No surgery found *      Hospital Course:  Patient was transferred to [facility] for possible partial small-bowel obstruction.  Patient was treated with COVID with remdesivir, also given IV antibiotics empirically and IV fluids.  Patient was significantly dehydrated.  Patient was able to tolerate regular diet without issue, only minimal tenderness to palpation.  Blood cultures from [facility], no longer had diarrhea.  Patient deemed safe for outpatient management and follow up.  Prescription for Paxlovid given.  Return precautions given.  Patient felt well on day of discharge and wanted to go home.  I answered all her questions.  Return precautions given.  Encouraged liberal fluid intake.  Had some borderline low blood pressures related to dehydration in the setting of COVID (unvaccinated), PTA lisinopril was held.  Can be restarted at the discretion of  outpatient physician if required.    Physical Exam  Constitutional:       Appearance: Normal appearance.   HENT:      Head: Normocephalic.      Right Ear: External ear normal.      Left Ear: External ear normal.      Nose: Nose normal.      Mouth/Throat:      Mouth: Mucous membranes are moist.   Eyes:      Extraocular Movements: Extraocular movements intact.      Pupils: Pupils are equal, round, and reactive to light.   Cardiovascular:      Rate and Rhythm: Normal rate and regular rhythm.   Pulmonary:      Effort: Pulmonary effort is normal.      Breath sounds: Normal breath sounds.   Abdominal:      Tenderness: There is abdominal tenderness, minimal. There is no guarding or rebound.      Comments:  Normoactive or hyperactive bowel sounds  Skin:     General: Skin is warm.      Capillary Refill: Capillary refill takes less than 2 seconds.   Neurological:      General: No focal deficit present.      Mental Status: She is alert and oriented to person, place, and time.   Psychiatric:         Mood and Affect: Mood normal.         Behavior: Behavior normal.     Final Active Diagnoses:    Diagnosis Date Noted POA    PRINCIPAL PROBLEM:  Partial small bowel obstruction [K56.600] 05/27/2023 Yes      Problems Resolved During this Admission:      Discharged Condition: good    Disposition: Home or Self Care    Follow Up:    Patient Instructions:   No discharge procedures on file.  Medications:  Reconciled Home Medications:      Medication List        START taking these medications      PAXLOVID (EUA) 300 mg (150 mg x 2)-100 mg copackaged tablets (EUA)  Generic drug: nirmatrelvir-ritonavir  Take 3 tablets by mouth 2 (two) times daily. Each dose contains 2 nirmatrelvir (pink tablets) and 1 ritonavir (white tablet). Take all 3 tablets together            CONTINUE taking these medications      butalbital-acetaminophen-caffeine -40 mg -40 mg per tablet  Commonly known as: FIORICET, ESGIC  Take 1 tablet by mouth every 4  (four) hours as needed.     cetirizine 10 MG tablet  Commonly known as: ZYRTEC  Take 1 tablet (10 mg total) by mouth once daily.     diclofenac 75 MG EC tablet  Commonly known as: VOLTAREN  Take 1 tablet (75 mg total) by mouth 2 (two) times daily.     EScitalopram oxalate 20 MG tablet  Commonly known as: LEXAPRO  Take 20 mg by mouth once daily.     ondansetron 4 MG tablet  Commonly known as: ZOFRAN  Take 1 tablet (4 mg total) by mouth every 6 (six) hours.     ondansetron 4 MG Tbdl  Commonly known as: ZOFRAN-ODT  Take 1 tablet (4 mg total) by mouth every 6 (six) hours as needed (nausea).            STOP taking these medications      HYDROcodone-acetaminophen 5-325 mg per tablet  Commonly known as: NORCO     lisinopriL 40 MG tablet  Commonly known as: PRINIVIL,ZESTRIL     promethazine 25 MG tablet  Commonly known as: PHENERGAN                  Pending Diagnostic Studies:       None          Indwelling Lines/Drains at time of discharge:   Lines/Drains/Airways       None                   Time spent on the discharge of patient: 40 minutes         Onur Mendez MD  Department of Hospital Medicine  [facility]

## 2023-12-01 ENCOUNTER — OFFICE VISIT (OUTPATIENT)
Dept: GASTROENTEROLOGY | Facility: CLINIC | Age: 28
End: 2023-12-01
Payer: MEDICAID

## 2023-12-01 VITALS
DIASTOLIC BLOOD PRESSURE: 82 MMHG | HEART RATE: 86 BPM | BODY MASS INDEX: 45.5 KG/M2 | SYSTOLIC BLOOD PRESSURE: 113 MMHG | WEIGHT: 247.25 LBS | OXYGEN SATURATION: 99 % | HEIGHT: 62 IN

## 2023-12-01 DIAGNOSIS — K21.9 GASTROESOPHAGEAL REFLUX DISEASE, UNSPECIFIED WHETHER ESOPHAGITIS PRESENT: Primary | ICD-10-CM

## 2023-12-01 DIAGNOSIS — K58.1 IRRITABLE BOWEL SYNDROME WITH CONSTIPATION: ICD-10-CM

## 2023-12-01 PROCEDURE — 3074F SYST BP LT 130 MM HG: CPT | Mod: CPTII,,, | Performed by: NURSE PRACTITIONER

## 2023-12-01 PROCEDURE — 99999 PR PBB SHADOW E&M-EST. PATIENT-LVL III: CPT | Mod: PBBFAC,,, | Performed by: NURSE PRACTITIONER

## 2023-12-01 PROCEDURE — 3079F DIAST BP 80-89 MM HG: CPT | Mod: CPTII,,, | Performed by: NURSE PRACTITIONER

## 2023-12-01 PROCEDURE — 99999 PR PBB SHADOW E&M-EST. PATIENT-LVL III: ICD-10-PCS | Mod: PBBFAC,,, | Performed by: NURSE PRACTITIONER

## 2023-12-01 PROCEDURE — 3074F PR MOST RECENT SYSTOLIC BLOOD PRESSURE < 130 MM HG: ICD-10-PCS | Mod: CPTII,,, | Performed by: NURSE PRACTITIONER

## 2023-12-01 PROCEDURE — 3008F PR BODY MASS INDEX (BMI) DOCUMENTED: ICD-10-PCS | Mod: CPTII,,, | Performed by: NURSE PRACTITIONER

## 2023-12-01 PROCEDURE — 99213 OFFICE O/P EST LOW 20 MIN: CPT | Mod: PBBFAC,PN | Performed by: NURSE PRACTITIONER

## 2023-12-01 PROCEDURE — 4010F ACE/ARB THERAPY RXD/TAKEN: CPT | Mod: CPTII,,, | Performed by: NURSE PRACTITIONER

## 2023-12-01 PROCEDURE — 3008F BODY MASS INDEX DOCD: CPT | Mod: CPTII,,, | Performed by: NURSE PRACTITIONER

## 2023-12-01 PROCEDURE — 99204 PR OFFICE/OUTPT VISIT, NEW, LEVL IV, 45-59 MIN: ICD-10-PCS | Mod: S$PBB,,, | Performed by: NURSE PRACTITIONER

## 2023-12-01 PROCEDURE — 99204 OFFICE O/P NEW MOD 45 MIN: CPT | Mod: S$PBB,,, | Performed by: NURSE PRACTITIONER

## 2023-12-01 PROCEDURE — 3079F PR MOST RECENT DIASTOLIC BLOOD PRESSURE 80-89 MM HG: ICD-10-PCS | Mod: CPTII,,, | Performed by: NURSE PRACTITIONER

## 2023-12-01 PROCEDURE — 1159F MED LIST DOCD IN RCRD: CPT | Mod: CPTII,,, | Performed by: NURSE PRACTITIONER

## 2023-12-01 PROCEDURE — 4010F PR ACE/ARB THEARPY RXD/TAKEN: ICD-10-PCS | Mod: CPTII,,, | Performed by: NURSE PRACTITIONER

## 2023-12-01 PROCEDURE — 1159F PR MEDICATION LIST DOCUMENTED IN MEDICAL RECORD: ICD-10-PCS | Mod: CPTII,,, | Performed by: NURSE PRACTITIONER

## 2023-12-01 RX ORDER — KETOCONAZOLE 20 MG/ML
SHAMPOO, SUSPENSION TOPICAL
COMMUNITY

## 2023-12-01 RX ORDER — LISINOPRIL 40 MG/1
1 TABLET ORAL DAILY
COMMUNITY

## 2023-12-01 RX ORDER — ERGOCALCIFEROL 1.25 MG/1
CAPSULE ORAL
Status: ON HOLD | COMMUNITY
End: 2024-03-14

## 2023-12-01 RX ORDER — PANTOPRAZOLE SODIUM 40 MG/1
40 TABLET, DELAYED RELEASE ORAL DAILY
Qty: 30 TABLET | Refills: 11 | Status: SHIPPED | OUTPATIENT
Start: 2023-12-01 | End: 2024-01-02 | Stop reason: SDUPTHER

## 2023-12-01 RX ORDER — ALBUTEROL SULFATE 90 UG/1
AEROSOL, METERED RESPIRATORY (INHALATION)
Status: ON HOLD | COMMUNITY
End: 2024-03-14

## 2023-12-01 RX ORDER — LEVONORGESTREL 19.5 MG/1
INTRAUTERINE DEVICE INTRAUTERINE
Status: ON HOLD | COMMUNITY
End: 2024-03-14

## 2023-12-01 NOTE — PROGRESS NOTES
GASTROENTEROLOGY CLINIC NOTE    Chief Complaint: The primary encounter diagnosis was Gastroesophageal reflux disease, unspecified whether esophagitis present. A diagnosis of Irritable bowel syndrome with constipation was also pertinent to this visit.  Referring provider/PCP: Melissa Chavez NP Brittani Fawn Escobedo is a 27 y.o. female who is a new patient to me with a PMH of ileus/SBO. She is here today to establish care for reflux and constipation. Treated for colitis in May with Flagyl/Cipro. Abdominal pain and diarrhea improved temporarily then returned two weeks after completing abx. She was then hospitalized d/t concern for ileus/SBO. Symptoms improved with bowel rest and IV abx. Wall thickening of right colon resolved on repeat CT.     Having constipation followed by diarrhea. Bowel movements occurring every few days. Previously having daily bowel movements.   Straining with bowel movements, incomplete emptying, and urgency.   No obvious blood in stool, nocturnal bowel movements.   Some abdominal pain but it improves with bowel movements.   No daily fiber supplements.     Heartburn  and acid reflux daily. Ongoing for several years.   No nocturnal symptoms. Worse after eating but not triggered by any particular foods. Frequent belching. No dysphagia.     GLP-1s: No  NSAIDs: No  Anticoagulation or Antiplatelet: No    History of H.pylori:  H.pylori Treatment:  Prior Upper Endoscopy: no  Prior Colonoscopy: no  Family h/o Colon Cancer: No  Family h/o Crohn's Disease or Ulcerative Colitis: No  Family h/o Celiac Sprue: No  Abdominal Surgeries:       Review of Systems   Constitutional:  Negative for weight loss.   HENT:  Negative for sore throat.    Eyes:  Negative for blurred vision.   Respiratory:  Negative for cough.    Cardiovascular:  Negative for chest pain.   Gastrointestinal:  Positive for constipation, diarrhea and heartburn. Negative for abdominal pain, blood in stool, melena, nausea  and vomiting.   Genitourinary:  Negative for dysuria.   Musculoskeletal:  Negative for myalgias.   Skin:  Negative for rash.   Neurological:  Negative for headaches.   Endo/Heme/Allergies:  Negative for environmental allergies.   Psychiatric/Behavioral:  Negative for suicidal ideas. The patient is not nervous/anxious.        Past Medical History: has a past medical history of colitis, Depression, and Hypertension.    Past Surgical History: has a past surgical history that includes  section (2015) and Dilation and curettage of uterus.    Family History:family history is not on file.    Allergies:   Review of patient's allergies indicates:   Allergen Reactions    Nitrofurantoin monohyd/m-cryst        Social History: reports that she has never smoked. She has never used smokeless tobacco. She reports that she does not drink alcohol and does not use drugs.    Home medications:   Current Outpatient Medications on File Prior to Visit   Medication Sig Dispense Refill    albuterol (PROVENTIL/VENTOLIN HFA) 90 mcg/actuation inhaler INHALE TWO PUFFS BY MOUTH EVERY 6 HOURS IF NEEDED FOR WHEEZING      cetirizine (ZYRTEC) 10 MG tablet Take 1 tablet (10 mg total) by mouth once daily. 30 tablet 0    ergocalciferol (ERGOCALCIFEROL) 50,000 unit Cap Take 1 capsule every week by oral route.      ketoconazole (NIZORAL) 2 % shampoo Can Shampoo hair up to 3 nights per week PRN Dandruff / Flakes Leave on for 10 minutes, then rinse out. Repeat as needed for up to 2 months      levonorgestreL (KYLEENA) 17.5 mcg/24 hrs (5 yrs) 19.5 mg IUD Kyleena 17.5 mcg/24 hrs (5yrs) 19.5mg intrauterine device   Take by intrauterine route.      lisinopriL (PRINIVIL,ZESTRIL) 40 MG tablet Take 1 tablet by mouth once daily.      [DISCONTINUED] diclofenac (VOLTAREN) 75 MG EC tablet Take 1 tablet (75 mg total) by mouth 2 (two) times daily. 30 tablet 0    [DISCONTINUED] escitalopram oxalate (LEXAPRO) 20 MG tablet Take 20 mg by mouth once daily.       "[DISCONTINUED] ondansetron (ZOFRAN) 4 MG tablet Take 1 tablet (4 mg total) by mouth every 6 (six) hours. 12 tablet 0    [DISCONTINUED] ondansetron (ZOFRAN-ODT) 4 MG TbDL Take 1 tablet (4 mg total) by mouth every 6 (six) hours as needed (nausea). 12 tablet 0     No current facility-administered medications on file prior to visit.       Vital signs:  /82 (BP Location: Left arm, Patient Position: Sitting, BP Method: Medium (Automatic))   Pulse 86   Ht 5' 2" (1.575 m)   Wt 112.1 kg (247 lb 3.9 oz)   SpO2 99%   BMI 45.22 kg/m²     Physical Exam  Vitals reviewed.   Constitutional:       General: She is not in acute distress.     Appearance: Normal appearance. She is not ill-appearing.   HENT:      Head: Normocephalic.   Cardiovascular:      Rate and Rhythm: Normal rate and regular rhythm.      Heart sounds: Normal heart sounds. No murmur heard.  Pulmonary:      Effort: Pulmonary effort is normal. No respiratory distress.      Breath sounds: Normal breath sounds.   Chest:      Chest wall: No tenderness.   Abdominal:      General: Bowel sounds are normal. There is no distension.      Palpations: Abdomen is soft.      Tenderness: There is no abdominal tenderness. Negative signs include Dutton's sign.      Hernia: No hernia is present.   Skin:     General: Skin is warm.   Neurological:      Mental Status: She is alert and oriented to person, place, and time.   Psychiatric:         Mood and Affect: Mood normal.         Behavior: Behavior normal.         Routine labs:  Lab Results   Component Value Date    WBC 4.53 05/29/2023    HGB 11.3 (L) 05/29/2023    HCT 35.4 (L) 05/29/2023    MCV 90 05/29/2023     05/29/2023     Lab Results   Component Value Date    INR 1.0 05/27/2023     No results found for: "IRON", "FERRITIN", "TIBC", "FESATURATED"  Lab Results   Component Value Date     05/29/2023    K 4.0 05/29/2023     05/29/2023    CO2 26 05/29/2023    BUN 9 05/29/2023    CREATININE 0.9 05/29/2023 " "    Lab Results   Component Value Date    ALBUMIN 3.7 05/27/2023    ALT 28 05/27/2023    AST 20 05/27/2023    ALKPHOS 60 05/27/2023    BILITOT 0.5 05/27/2023     No results found for: "GLUCOSE"  Lab Results   Component Value Date    TSH 0.62 11/18/2019     Lab Results   Component Value Date    CALCIUM 8.5 (L) 05/29/2023       Imaging:  CT Abdomen With Without Contrast  Narrative: EXAMINATION:  CT ABDOMEN W WO CONTRAST    CLINICAL HISTORY:  e27.8; Other specified disorders of adrenal gland    TECHNIQUE:  Low dose axial images, sagittal and coronal reformations were obtained from the lung bases to the iliac crest following the IV administration of 100 mL of Omnipaque 350 and .  Oral contrast was not administered  Non contrast, arterial, portal venous and delayed phases were acquired over the abdomen.    COMPARISON:  05/27/2023    FINDINGS:  Visualized portions of the lung bases and heart show no significant abnormalities.    Persistent geographic hypodensities within the posterior right hepatic lobe without discrete lesion.  No biliary ductal dilatation.  Portal vein is patent.  Gallbladder shows no radiopaque stones or inflammatory changes.    Stomach, spleen, pancreas and right adrenal gland shows no significant abnormalities.    There is a 2.7 cm left adrenal nodule.  This nodule measures -5 Hounsfield units on precontrast imaging.  The nodule measures 55 Hounsfield units on postcontrast imaging and 6 Hounsfield units on 15 minute delay compatible with a absolute washout of 81.7 % and relative washout of 89.1%    Visualized portion of small and large bowel show no acute inflammation or obstruction.  Stable submucosal fat deposition in the right colon.  Appendix is normal.  No free air or ascites.    Kidneys show no nephrolithiasis or hydronephrosis.  Kidneys concentrate excrete contrast on delayed images.    Abdominal aorta is normal in caliber and the major aortic branch vessels are patent.  No abnormal lymph node " enlargement.    Osseous structures show no acute abnormalities.  Impression: No acute abnormalities in the abdomen or pelvis.    Stable left adrenal nodule with imaging findings consistent with an adrenal adenoma.    Persistent geographic areas of hypoattenuation in the right hepatic lobe possibly fatty infiltration.    Electronically signed by: Hugo Mckeon MD  Date:    08/10/2023  Time:    17:07      I have reviewed prior labs, imaging, and notes.      Assessment:  1. Gastroesophageal reflux disease, unspecified whether esophagitis present    2. Irritable bowel syndrome with constipation      Reflux and heartburn occurring daily. No specific food triggers to symptoms. Accompanied by frequent belching.   Constipation occurring following hospitalization for ileus/sbo. Bowel movements every few days.     Plan:  Orders Placed This Encounter    pantoprazole (PROTONIX) 40 MG tablet    Case Request Endoscopy: EGD (ESOPHAGOGASTRODUODENOSCOPY)     Protonix 40mg daily. Take 30 minutes before first meal of the day.   Start Metamucil daily.   Increase water intake.   EGD. Consider biopsy for H.pylori      Plan of care discussed with patient who is in agreement and verbalized understanding.     I have explained the planned procedures to the patient.The risks, benefits and alternatives of the procedure were also explained in detail. Patient verbalized understanding, all questions were answered. The patient agrees to proceed as planned    Follow Up: SHANNON Locke, APRN,FNP-BC  [facility]    (Portions of this note were dictated using voice recognition software and may contain dictation related errors in spelling/grammar/syntax not found on text review)

## 2023-12-01 NOTE — PATIENT INSTRUCTIONS
Continue good water intake.   Start daily fiber such as Metamucil (psyllium husk). Mix 1 tablespoon in 7 ounces or water OR take three capsules once a day.   Start pantoprazole once a day. Take 30 minutes before first meal of the day.   Upper endoscopy.

## 2023-12-18 ENCOUNTER — TELEPHONE (OUTPATIENT)
Dept: GASTROENTEROLOGY | Facility: CLINIC | Age: 28
End: 2023-12-18
Payer: MEDICAID

## 2023-12-18 NOTE — TELEPHONE ENCOUNTER
Changed the patient's appt for her EGD to 12/28/2023.      ----- Message from Tyson Ratliff MA sent at 12/18/2023  3:05 PM CST -----  Regarding: FW: return call  Contact: Aisha    ----- Message -----  From: Karo Ruiz  Sent: 12/18/2023   2:25 PM CST  To: Yolanda Bautista Staff  Subject: return call                                      Caller:Aisha        Returning call to: Staff, to reschedule an appt  (EGD)that is suppose to be on 12/27/2023 to 12/28/2023. Please advise. Requesting a call back.        Caller can be reached @:    [phone] ([place])

## 2024-01-02 ENCOUNTER — TELEPHONE (OUTPATIENT)
Dept: GASTROENTEROLOGY | Facility: CLINIC | Age: 29
End: 2024-01-02

## 2024-01-02 DIAGNOSIS — K25.3 ACUTE GASTRIC ULCER WITHOUT HEMORRHAGE OR PERFORATION: Primary | ICD-10-CM

## 2024-01-02 DIAGNOSIS — K21.9 GASTROESOPHAGEAL REFLUX DISEASE, UNSPECIFIED WHETHER ESOPHAGITIS PRESENT: ICD-10-CM

## 2024-01-02 RX ORDER — PANTOPRAZOLE SODIUM 40 MG/1
40 TABLET, DELAYED RELEASE ORAL 2 TIMES DAILY
Qty: 120 TABLET | Refills: 0 | Status: SHIPPED | OUTPATIENT
Start: 2024-01-02 | End: 2024-03-02

## 2024-01-08 ENCOUNTER — PATIENT MESSAGE (OUTPATIENT)
Dept: GASTROENTEROLOGY | Facility: CLINIC | Age: 29
End: 2024-01-08

## 2024-01-08 RX ORDER — CLARITHROMYCIN 500 MG/1
500 TABLET, FILM COATED ORAL EVERY 12 HOURS
Qty: 28 TABLET | Refills: 0 | Status: SHIPPED | OUTPATIENT
Start: 2024-01-08 | End: 2024-01-22

## 2024-01-08 RX ORDER — AMOXICILLIN 500 MG/1
1000 TABLET, FILM COATED ORAL EVERY 12 HOURS
Qty: 56 TABLET | Refills: 0 | Status: SHIPPED | OUTPATIENT
Start: 2024-01-08 | End: 2024-01-22

## 2024-01-11 ENCOUNTER — TELEPHONE (OUTPATIENT)
Dept: GASTROENTEROLOGY | Facility: CLINIC | Age: 29
End: 2024-01-11

## 2024-01-11 NOTE — TELEPHONE ENCOUNTER
Left VM for patient to      ----- Message from Cole Rubio sent at 1/11/2024  3:04 PM CST -----  Contact: [phone]  The patient would like a call back at your earliest convince.  The pt can be reached at   [phone]

## 2024-01-23 ENCOUNTER — TELEPHONE (OUTPATIENT)
Dept: OBSTETRICS AND GYNECOLOGY | Facility: CLINIC | Age: 29
End: 2024-01-23

## 2024-01-31 ENCOUNTER — OFFICE VISIT (OUTPATIENT)
Dept: OBSTETRICS AND GYNECOLOGY | Facility: CLINIC | Age: 29
End: 2024-01-31
Payer: MEDICAID

## 2024-01-31 VITALS
DIASTOLIC BLOOD PRESSURE: 82 MMHG | HEIGHT: 62 IN | SYSTOLIC BLOOD PRESSURE: 134 MMHG | BODY MASS INDEX: 45.15 KG/M2 | WEIGHT: 245.38 LBS | HEART RATE: 105 BPM

## 2024-01-31 DIAGNOSIS — Z30.011 ENCOUNTER FOR INITIAL PRESCRIPTION OF CONTRACEPTIVE PILLS: Primary | ICD-10-CM

## 2024-01-31 DIAGNOSIS — Z86.19 HISTORY OF GONORRHEA: ICD-10-CM

## 2024-01-31 DIAGNOSIS — Z30.432 ENCOUNTER FOR IUD REMOVAL: ICD-10-CM

## 2024-01-31 LAB
B-HCG UR QL: NEGATIVE
CTP QC/QA: YES

## 2024-01-31 PROCEDURE — 81025 URINE PREGNANCY TEST: CPT | Mod: PBBFAC,PN | Performed by: NURSE PRACTITIONER

## 2024-01-31 PROCEDURE — 99213 OFFICE O/P EST LOW 20 MIN: CPT | Mod: PBBFAC,PN | Performed by: NURSE PRACTITIONER

## 2024-01-31 PROCEDURE — 3079F DIAST BP 80-89 MM HG: CPT | Mod: CPTII,,, | Performed by: NURSE PRACTITIONER

## 2024-01-31 PROCEDURE — 58301 REMOVE INTRAUTERINE DEVICE: CPT | Mod: S$PBB,,, | Performed by: NURSE PRACTITIONER

## 2024-01-31 PROCEDURE — 1160F RVW MEDS BY RX/DR IN RCRD: CPT | Mod: CPTII,,, | Performed by: NURSE PRACTITIONER

## 2024-01-31 PROCEDURE — 99204 OFFICE O/P NEW MOD 45 MIN: CPT | Mod: 25,S$PBB,, | Performed by: NURSE PRACTITIONER

## 2024-01-31 PROCEDURE — 1159F MED LIST DOCD IN RCRD: CPT | Mod: CPTII,,, | Performed by: NURSE PRACTITIONER

## 2024-01-31 PROCEDURE — 3075F SYST BP GE 130 - 139MM HG: CPT | Mod: CPTII,,, | Performed by: NURSE PRACTITIONER

## 2024-01-31 PROCEDURE — 99999 PR PBB SHADOW E&M-EST. PATIENT-LVL III: CPT | Mod: PBBFAC,,, | Performed by: NURSE PRACTITIONER

## 2024-01-31 PROCEDURE — 3008F BODY MASS INDEX DOCD: CPT | Mod: CPTII,,, | Performed by: NURSE PRACTITIONER

## 2024-01-31 PROCEDURE — 99999PBSHW POCT URINE PREGNANCY: Mod: PBBFAC,,,

## 2024-01-31 PROCEDURE — 58301 REMOVE INTRAUTERINE DEVICE: CPT | Mod: PBBFAC,PN | Performed by: NURSE PRACTITIONER

## 2024-01-31 PROCEDURE — 87491 CHLMYD TRACH DNA AMP PROBE: CPT | Performed by: NURSE PRACTITIONER

## 2024-01-31 RX ORDER — NORETHINDRONE ACETATE AND ETHINYL ESTRADIOL 1MG-20(21)
1 KIT ORAL DAILY
Qty: 30 TABLET | Refills: 11 | Status: ON HOLD | OUTPATIENT
Start: 2024-01-31 | End: 2024-03-14

## 2024-01-31 RX ORDER — AMOXICILLIN 500 MG/1
CAPSULE ORAL
Status: ON HOLD | COMMUNITY
Start: 2024-01-08 | End: 2024-03-14

## 2024-01-31 RX ORDER — CETIRIZINE HYDROCHLORIDE 10 MG/1
1 TABLET ORAL DAILY
COMMUNITY
Start: 2023-06-07

## 2024-01-31 RX ORDER — SILVER SULFADIAZINE 10 G/1000G
CREAM TOPICAL
Status: ON HOLD | COMMUNITY
Start: 2024-01-25 | End: 2024-03-14

## 2024-01-31 RX ORDER — METRONIDAZOLE 500 MG/1
500 TABLET ORAL 2 TIMES DAILY
Status: ON HOLD | COMMUNITY
Start: 2023-12-04 | End: 2024-03-14

## 2024-01-31 NOTE — PROCEDURES
Removal of IUD    Date/Time: 1/31/2024 1:20 PM    Performed by: Le Nuno NP-C  Authorized by: Le Nuno NP-C    Consent obtained:  Prior to procedure the appropriate consent was completed and verified  Consent given by:  Patient  Procedure risks and benefits discussed: yes    Patient questions answered: yes    Implant grasped by: ring forceps  Removed with no complications: yes    Removal due to expiration: yes

## 2024-01-31 NOTE — PROGRESS NOTES
"  Chief Complaint: Contraception Counseling     HPI:     Aisha is a 28 y.o.  who presents today to discuss contraceptive options. She is currently sexually active with a single male partner. She is currently using Kyleena inserted in 2019 for contraception. Requesting to have IUD removed today as it is expiring this year. She is without other complaints at this time. She requested STD screening today. She was treat for gonorrhea last month.     Denies migraines, VTE, smoking. Currently on 40 mg of lisinopril for HTN.     Past Medical History:   Diagnosis Date    colitis     Depression     Hypertension      Family History   Problem Relation Age of Onset    Colon cancer Paternal Grandmother     Breast cancer Paternal Grandmother     Breast cancer Maternal Grandmother     Ovarian cancer Maternal Grandmother      Social History     Tobacco Use    Smoking status: Never    Smokeless tobacco: Never   Substance Use Topics    Alcohol use: No    Drug use: No       ROS:     GENERAL: Denies fevers or chills. Feeling well overall.   HEENT: denies h/o migraine.  URINARY: Denies frequency, dysuria, hematuria.  GYNECOLOGIC: denies heavy menses. denies dysmenorrhea.  DERMATOLOGIC: denies acne.     Physical Exam:      PHYSICAL EXAM:  /82   Pulse 105   Ht 5' 2" (1.575 m)   Wt 111.3 kg (245 lb 6 oz)   LMP 2024 (Exact Date)   BMI 44.88 kg/m²  Body mass index is 44.88 kg/m².  APPEARANCE: Well nourished, well developed, in no acute distress.  PSYCH: Appropriate mood and affect.  EXTREMITIES: No edema.   Pelvic exam: normal external genitalia, vulva, vagina, cervix,      Assessment/Plan:     Aisha was seen today for ovarian cyst.    Diagnoses and all orders for this visit:    Encounter for initial prescription of contraceptive pills  -     POCT Urine Pregnancy  -     norethindrone-ethinyl estradiol (JUNEL FE 1/20) 1 mg-20 mcg (21)/75 mg (7) per tablet; Take 1 tablet by mouth once daily.    History of " gonorrhea  -     C. trachomatis/N. gonorrhoeae by AMP DNA    Encounter for IUD removal  -     Removal of IUD          Counseling:     The risks of, benefits of, and alternatives of various forms of contraception were discussed at this visit. After a discussion of the R/B/A of fertility awareness, barrier contraception, hormonal pills, injections, patches, rings, hormonal and non-hormonal IUDs, and the subdermal implant, all of  questions were answered, and she has opted for oral contraceptive pills.    Condoms for STD protection were discussed, as was Plan B in the event of unprotected intercourse.   Recommendations for STD screening based on Aisha's age and sexual habits were reviewed.    PORFIRIO gonorrhea.    See procedure note for IUD removal.    F/U in 1 month for BP check.

## 2024-02-07 LAB
C TRACH DNA SPEC QL NAA+PROBE: NOT DETECTED
N GONORRHOEA DNA SPEC QL NAA+PROBE: DETECTED

## 2024-02-08 ENCOUNTER — TELEPHONE (OUTPATIENT)
Dept: OBSTETRICS AND GYNECOLOGY | Facility: CLINIC | Age: 29
End: 2024-02-08

## 2024-02-08 DIAGNOSIS — A54.9 GONORRHEA: Primary | ICD-10-CM

## 2024-02-08 RX ORDER — CEFTRIAXONE 500 MG/1
500 INJECTION, POWDER, FOR SOLUTION INTRAMUSCULAR; INTRAVENOUS
Status: COMPLETED | OUTPATIENT
Start: 2024-02-08 | End: 2024-02-12

## 2024-02-08 NOTE — TELEPHONE ENCOUNTER
----- Message from JUSTIN Carlson sent at 2/8/2024 12:37 PM CST -----  The gonorrhea was positive again. You can come into the clinic by appt with the nurse to get another rocephin injection. Please make sure your partner gets treated as well and make sure you both refrain from intercourse for 7 days after you both have completed treatment. Please schedule appt. Order is in.

## 2024-02-08 NOTE — TELEPHONE ENCOUNTER
Spoke with Aisha and informed her of results and provider's directions. Offered inj on 2/12, she accepted. Sent appt to Evelyn to schedule. Pt MANDI.

## 2024-02-12 ENCOUNTER — CLINICAL SUPPORT (OUTPATIENT)
Dept: OBSTETRICS AND GYNECOLOGY | Facility: CLINIC | Age: 29
End: 2024-02-12
Payer: MEDICAID

## 2024-02-12 DIAGNOSIS — Z86.19 HISTORY OF GONORRHEA: Primary | ICD-10-CM

## 2024-02-12 PROCEDURE — 96372 THER/PROPH/DIAG INJ SC/IM: CPT | Mod: PBBFAC,PN

## 2024-02-12 PROCEDURE — 99999PBSHW PR PBB SHADOW TECHNICAL ONLY FILED TO HB: Mod: PBBFAC,,,

## 2024-02-12 RX ADMIN — CEFTRIAXONE SODIUM 500 MG: 500 INJECTION, POWDER, FOR SOLUTION INTRAMUSCULAR; INTRAVENOUS at 09:02

## 2024-02-12 NOTE — PROGRESS NOTES
HERE FOR ROCEPHIN INJECTION 500 MG/ML PATIENT WITHOUT COMPLAINT AT THIS TIME. ALLERGIES REVIEWED, INJECTION GIVEN ADVISED TO WAIT IN LOBBY FOR 20 MINUTES POST INJECTION AND TO REPORT ANY ADVERSE REACTION. NO PAN NOTED PRIOR TO OR POST INJECTION .           SITE: [facility]     ORDER VERIFIED     PACKAGE INSPECTED, STORAGE VERIFIED AS WELL AS EXPIRATION VERIFIED BY NURSE      CLINIC SUPPLIED MEDICATION     SEE MED CARD FOR INFORMATION

## 2024-03-15 ENCOUNTER — OFFICE VISIT (OUTPATIENT)
Dept: GASTROENTEROLOGY | Facility: CLINIC | Age: 29
End: 2024-03-15

## 2024-03-15 ENCOUNTER — TELEPHONE (OUTPATIENT)
Dept: HEPATOLOGY | Facility: CLINIC | Age: 29
End: 2024-03-15

## 2024-03-15 DIAGNOSIS — K29.70 GASTRITIS, HELICOBACTER PYLORI: ICD-10-CM

## 2024-03-15 DIAGNOSIS — K21.9 GASTROESOPHAGEAL REFLUX DISEASE, UNSPECIFIED WHETHER ESOPHAGITIS PRESENT: Primary | ICD-10-CM

## 2024-03-15 DIAGNOSIS — K20.80 ESOPHAGITIS, LOS ANGELES GRADE A: ICD-10-CM

## 2024-03-15 DIAGNOSIS — K59.04 CHRONIC IDIOPATHIC CONSTIPATION: ICD-10-CM

## 2024-03-15 DIAGNOSIS — B96.81 GASTRITIS, HELICOBACTER PYLORI: ICD-10-CM

## 2024-03-15 DIAGNOSIS — K29.30 CHRONIC SUPERFICIAL GASTRITIS WITHOUT BLEEDING: ICD-10-CM

## 2024-03-15 DIAGNOSIS — R93.89 ABNORMAL FINDING ON CT SCAN: ICD-10-CM

## 2024-03-15 PROCEDURE — 99214 OFFICE O/P EST MOD 30 MIN: CPT | Mod: 95,,, | Performed by: NURSE PRACTITIONER

## 2024-03-15 RX ORDER — PANTOPRAZOLE SODIUM 40 MG/1
40 TABLET, DELAYED RELEASE ORAL DAILY
Qty: 30 TABLET | Refills: 11 | Status: SHIPPED | OUTPATIENT
Start: 2024-03-15 | End: 2025-03-15

## 2024-03-15 NOTE — Clinical Note
Please schedule follow up in 3 months; virtual is fine.  Linzess and Protonix sent to pharmacy. Protonix may need a PA. Patient reports insurance not covering. I can also do an appeal d/t EGD findings she needs to be on a PPI.  Referral to hepatology.

## 2024-03-15 NOTE — TELEPHONE ENCOUNTER
----- Message from John Nguyễn MA sent at 3/15/2024 11:39 AM CDT -----  Please call the patient referral in the chart.  Thank you,  John

## 2024-03-15 NOTE — PROGRESS NOTES
GASTROENTEROLOGY CLINIC NOTE    Chief Complaint: The primary encounter diagnosis was Gastroesophageal reflux disease, unspecified whether esophagitis present. Diagnoses of Gastritis, Helicobacter pylori, Esophagitis, Tumtum grade A, Chronic superficial gastritis without bleeding, Chronic idiopathic constipation, and Abnormal finding on CT scan were also pertinent to this visit.  Referring provider/PCP: Melissa Chavez, NATIVIDAD Leonard Fawn Escobedo is a 28 y.o. female who is a new patient to me with a PMH of ileus/SBO. She is here today to establish care for reflux and constipation. Treated for colitis in May with Flagyl/Cipro. Abdominal pain and diarrhea improved temporarily then returned two weeks after completing abx. She was then hospitalized d/t concern for ileus/SBO. Symptoms improved with bowel rest and IV abx. Wall thickening of right colon resolved on repeat CT.     Having constipation followed by diarrhea. Bowel movements occurring every few days. Previously having daily bowel movements.   Straining with bowel movements, incomplete emptying, and urgency.   No obvious blood in stool, nocturnal bowel movements.   Some abdominal pain but it improves with bowel movements.   No daily fiber supplements.     Heartburn  and acid reflux daily. Ongoing for several years.   No nocturnal symptoms. Worse after eating but not triggered by any particular foods. Frequent belching. No dysphagia.   _________________________________________________________  Interval Note 3/15/2024    The patient location is: [place]  The chief complaint leading to consultation is: Post procedure follow up, h.pylori, constipation, abnormal CT    Visit type: audiovisual    Face to Face time with patient: 9:51  15 minutes of total time spent on the encounter, which includes face to face time and non-face to face time preparing to see the patient (eg, review of tests), Obtaining and/or reviewing separately obtained history,  "Documenting clinical information in the electronic or other health record, Independently interpreting results (not separately reported) and communicating results to the patient/family/caregiver, or Care coordination (not separately reported).     Each patient to whom he or she provides medical services by telemedicine is:  (1) informed of the relationship between the physician and patient and the respective role of any other health care provider with respect to management of the patient; and (2) notified that he or she may decline to receive medical services by telemedicine and may withdraw from such care at any time.    Notes:      Aisha Escobedo who is known to me presents via telemedicine encounter for post-procedure follow up, constipation, and abnormal ct finding. Following last office visit, she underwent EGD with Dr. Guerrero which was significant for gastric ulcer and H.pylori. H.pylori treated with triple therapy. Protonix initiated and helps reflux but is no longer covered by insurance. Repeat EGD done about a week ago significant for LA Grade A esophagitis and chronic gastritis. Constipation continues. Did not have adequate results with fiber. Recent CT with moderate stool burden noted. CT also significant for "Ill-defined patchy geographic hypoattenuation noted in the right hepatic lobe (for example as seen on axial series 3, image 38). Question relatively similar findings on prior contrast enhanced CT performed 05/27/2023 and 05/08/2023." LFTs WNL. Will refer to hepatology for further evaluation.     GLP-1s: No  NSAIDs: No  Anticoagulation or Antiplatelet: No    History of H.pylori: yes 2023  H.pylori Treatment: Triple Therapy  Prior Upper Endoscopy: 3/6/2024 with Dr. Guerrero  Impression:            - LA Grade A esophagitis with no bleeding.                          - Erythematous mucosa in the antrum. Biopsied.                          - Erythematous duodenopathy.     Recommendation:        - " Discharge patient to home.                          - Resume previous diet.                          - Continue present medications.     Pathology:  Random gastric biopsy:   Chronic gastritis   No definitive Helicobacter pylori like organisms identified on appropriately controlled H pylori immunohistochemical stain   Negative for intestinal metaplasia, dysplasia, or malignancy     2023 with Dr. Guerrero  Impression:            - Normal esophagus.                          - Non-bleeding gastric ulcer with a clean ulcer                          base (Reuben Class III).                          - Normal examined duodenum.                          - Biopsies were taken with a cold forceps for                          Helicobacter pylori testing.     Recommendation:        - Discharge patient to home.                          - Resume previous diet.                          - Continue present medications.                          - Await pathology results.                          - Repeat upper endoscopy in 8     Pathology:  STOMACH, BIOPSY:   - Helicobacter-associated acute and chronic (follicular) gastritis   - Presence of organisms morphologically compatible with H. pylori identified on routine H&E stained sections     Prior Colonoscopy: no  Family h/o Colon Cancer: No  Family h/o Crohn's Disease or Ulcerative Colitis: No  Family h/o Celiac Sprue: No  Abdominal Surgeries:       Review of Systems   Constitutional:  Negative for weight loss.   HENT:  Negative for sore throat.    Eyes:  Negative for blurred vision.   Respiratory:  Negative for cough.    Cardiovascular:  Negative for chest pain.   Gastrointestinal:  Positive for constipation, diarrhea and heartburn. Negative for abdominal pain, blood in stool, melena, nausea and vomiting.   Genitourinary:  Negative for dysuria.   Musculoskeletal:  Negative for myalgias.   Skin:  Negative for rash.   Neurological:  Negative for headaches.    Endo/Heme/Allergies:  Negative for environmental allergies.   Psychiatric/Behavioral:  Negative for suicidal ideas. The patient is not nervous/anxious.        Past Medical History: has a past medical history of colitis, Depression, and Hypertension.    Past Surgical History: has a past surgical history that includes  section (); Dilation and curettage of uterus; egd, with closed biopsy (2023); Esophagogastroduodenoscopy (N/A, 2023); egd, with closed biopsy (2024); and Esophagogastroduodenoscopy (N/A, 3/6/2024).    Family History:family history includes Breast cancer in her maternal grandmother and paternal grandmother; Colon cancer in her paternal grandmother; Ovarian cancer in her maternal grandmother.    Allergies:   Review of patient's allergies indicates:   Allergen Reactions    Nitrofurantoin monohyd/m-cryst        Social History: reports that she has never smoked. She has never used smokeless tobacco. She reports that she does not drink alcohol and does not use drugs.    Home medications:   Current Outpatient Medications on File Prior to Visit   Medication Sig Dispense Refill    cetirizine (ZYRTEC) 10 MG tablet Take 1 tablet by mouth once daily.      ketoconazole (NIZORAL) 2 % shampoo Can Shampoo hair up to 3 nights per week PRN Dandruff / Flakes Leave on for 10 minutes, then rinse out. Repeat as needed for up to 2 months      lisinopriL (PRINIVIL,ZESTRIL) 40 MG tablet Take 1 tablet by mouth once daily.       Current Facility-Administered Medications on File Prior to Visit   Medication Dose Route Frequency Provider Last Rate Last Admin    0.9%  NaCl infusion   Intravenous Continuous Michele Guerrero MD 10 mL/hr at 24 0731 New Bag at 24 0731    LIDOcaine (PF) 10 mg/ml (1%) injection 10 mg  1 mL Intradermal Once PRN Michele Guerrero MD           Vital signs:  There were no vitals taken for this visit.    Physical Exam  Constitutional:       General:  "She is not in acute distress.     Appearance: Normal appearance. She is not ill-appearing.      Comments: Limited Physical Exam d/t Telemedicine Encounter   HENT:      Head: Normocephalic.   Cardiovascular:      Rate and Rhythm: Regular rhythm.   Pulmonary:      Effort: Pulmonary effort is normal. No respiratory distress.   Abdominal:      Tenderness: Negative signs include Dutton's sign.   Neurological:      Mental Status: She is alert and oriented to person, place, and time.   Psychiatric:         Mood and Affect: Mood normal.         Behavior: Behavior normal.         Thought Content: Thought content normal.         Judgment: Judgment normal.         Routine labs:  Lab Results   Component Value Date    WBC 7.12 01/13/2024    HGB 12.5 01/13/2024    HCT 40.2 01/13/2024    MCV 90 01/13/2024     01/13/2024     Lab Results   Component Value Date    INR 1.0 05/27/2023     No results found for: "IRON", "FERRITIN", "TIBC", "FESATURATED"  Lab Results   Component Value Date     01/13/2024    K 4.2 01/13/2024     01/13/2024    CO2 23 01/13/2024    BUN 16 01/13/2024    CREATININE 0.8 01/13/2024     Lab Results   Component Value Date    ALBUMIN 3.7 01/13/2024    ALT 32 01/13/2024    AST 26 01/13/2024    ALKPHOS 65 01/13/2024    BILITOT 0.8 01/13/2024     No results found for: "GLUCOSE"  Lab Results   Component Value Date    TSH 0.62 11/18/2019     Lab Results   Component Value Date    CALCIUM 9.1 01/13/2024       Imaging:  CT Abdomen Pelvis With IV Contrast NO Oral Contrast  Narrative: EXAMINATION:  CT ABDOMEN PELVIS WITH IV CONTRAST    CLINICAL HISTORY:  LLQ abdominal pain;LLQ/LUQ abd pain;    TECHNIQUE:  Low dose axial images, sagittal and coronal reformations were obtained from the lung bases to the pubic symphysis following the IV administration of 100 ML of Omnipaque 350    COMPARISON:  CT of the abdomen and pelvis performed 08/10/2023.    FINDINGS:  Lower chest: Unremarkable.    Liver: Normal contour. "  Ill-defined patchy geographic hypoattenuation noted in the right hepatic lobe (for example as seen on axial series 3, image 38).  Question relatively similar findings on prior contrast enhanced CT performed 05/27/2023 and 05/08/2023.    Gallbladder and bile ducts: Unremarkable. No biliary ductal dilatation.    Pancreas: Unremarkable.    Spleen: Unremarkable.  Suspect accessory splenule.    Adrenals: Stability of 25 mm left adrenal nodule, previously described as adrenal adenoma on 08/10/2023 CT.  Unremarkable appearance of the right adrenal gland.    Kidneys: Unremarkable.    Lymph nodes: Scattered mildly prominent number but morphologically normal and normally sized mesenteric and retroperitoneal lymph nodes are seen, possibly reactive in etiology.    Bowel and mesentery: Unremarkable.No evidence of bowel obstruction.  Moderate volume colonic stool.  Normal appendix.    Abdominal aorta: Unremarkable.    Inferior vena cava: Unremarkable.    Free fluid or free air: None.    Pelvis: Intrauterine device in-situ.  Peripherally enhancing 20 mm left adnexal lesion, possibly a corpus luteum cyst.    Body wall: Unremarkable.    Bones: No definite acute findings.  Small nonaggressive appearing sclerotic lesion in the left iliac bone, possibly enostosis/bone island.  Impression: 1. No definite acute findings identified in the abdomen or pelvis.  2. Appearance of nonspecific geographic patchy hypoattenuation in the right hepatic lobe appears to been present on prior CT of 08/10/2023 and 05/27/2023, with relative long-term stability of suggesting benign etiology.  Findings are nonspecific could reflect underlying somewhat atypical appearance of hepatic steatosis.  An acute inflammatory process could additionally be considered.  Dedicated hepatic mass protocol MRI or CT may provide clarification.  3. Question left-sided corpus luteum cyst.  4. Additional details of chronic and incidental findings, as provided in the body of the  report.    Electronically signed by: Tono Dent  Date:    01/13/2024  Time:    10:36  X-Ray Abdomen Flat And Erect  Narrative: EXAMINATION:  XR ABDOMEN FLAT AND ERECT    CLINICAL HISTORY:  Unspecified abdominal pain    TECHNIQUE:  Flat and erect AP views of the abdomen were performed.    COMPARISON:  05/29/2023    FINDINGS:  Mild stool retention.  Nonspecific bowel gas pattern.  No bowel dilation.  The upright film demonstrate no air-fluid level.  No organomegaly.  No abnormal calcifications.  The lung bases are clear.  No free air.  There is an IUD in place..  Impression: No acute process seen    Electronically signed by: Sunitha Martin MD  Date:    01/13/2024  Time:    09:35      I have reviewed prior labs, imaging, and notes.      Assessment:  1. Gastroesophageal reflux disease, unspecified whether esophagitis present    2. Gastritis, Helicobacter pylori    3. Esophagitis, Forrest grade A    4. Chronic superficial gastritis without bleeding    5. Chronic idiopathic constipation    6. Abnormal finding on CT scan      Reflux returned after stopping Protonix. Insurance no longer covering. LA Grade A esophagitis and chronic gastritis noted on most recent EGD. Eradication of H.pylori confirmed with biopsies on EGD.   Constipation continues despite increasing water intake and adding fiber supplement.   Hypoattenuation on right lobe of liver noted on recent CT Scan; LFTs WNL.     Plan:  Orders Placed This Encounter    Ambulatory referral/consult to Hepatology    pantoprazole (PROTONIX) 40 MG tablet    linaCLOtide (LINZESS) 145 mcg Cap capsule     Restart Protonix 40mg daily. Will appeal if insurance does not cover.   Start Linzess 145 mcg daily for constipation.   Referral to hepatology for further evaluation of CT findings.       Plan of care discussed with patient who is in agreement and verbalized understanding.     I have explained the planned procedures to the patient.The risks, benefits and  alternatives of the procedure were also explained in detail. Patient verbalized understanding, all questions were answered. The patient agrees to proceed as planned    Follow Up: 3 months        JOÃO Mims,FNP-BC  [facility]    (Portions of this note were dictated using voice recognition software and may contain dictation related errors in spelling/grammar/syntax not found on text review)

## 2024-03-18 ENCOUNTER — TELEPHONE (OUTPATIENT)
Dept: HEPATOLOGY | Facility: CLINIC | Age: 29
End: 2024-03-18

## 2024-03-18 DIAGNOSIS — R93.2 ABNORMAL LIVER DIAGNOSTIC IMAGING: Primary | ICD-10-CM

## 2024-03-18 NOTE — TELEPHONE ENCOUNTER
Call to patient and scheduled her for the MRI's Dr. Flannery ordered, and noticed that she is scheduled to see Dr. Kwan as well the day of said MRI's she would like to see which she would like to  Because she lives in [place]

## 2024-03-25 ENCOUNTER — TELEPHONE (OUTPATIENT)
Dept: HEPATOLOGY | Facility: CLINIC | Age: 29
End: 2024-03-25

## 2024-03-25 NOTE — TELEPHONE ENCOUNTER
Left a voice message for pt to please call her insurance about having testing done  and seeing being seen at [facility]   WDL

## 2024-03-25 NOTE — TELEPHONE ENCOUNTER
Unable to reach patient regarding scheduled appointment. Patient appointment canceled with Dr. Kwan, Dr. Carlson ordered MRI for patient, Dr. Guerrero referred patient to Dr. Carlson and spoke with her regarding patient.

## 2024-04-02 ENCOUNTER — TELEPHONE (OUTPATIENT)
Dept: HEPATOLOGY | Facility: CLINIC | Age: 29
End: 2024-04-02

## 2024-04-02 NOTE — TELEPHONE ENCOUNTER
Patient insurance is out of network/no insurance.  Faxed the referral order to [facility] with Presley Zarate faxed # [phone].  Spoke with Ms. Sherwood, [phone].  She notified and accepted the referral.

## 2024-12-20 ENCOUNTER — ANESTHESIA (OUTPATIENT)
Dept: SURGERY | Facility: OTHER | Age: 29
End: 2024-12-20
Payer: MEDICAID

## 2024-12-20 ENCOUNTER — HOSPITAL ENCOUNTER (OUTPATIENT)
Facility: OTHER | Age: 29
Discharge: HOME | End: 2024-12-21
Attending: EMERGENCY MEDICINE | Admitting: SURGERY
Payer: MEDICAID

## 2024-12-20 ENCOUNTER — ANESTHESIA EVENT (OUTPATIENT)
Dept: SURGERY | Facility: OTHER | Age: 29
End: 2024-12-20
Payer: MEDICAID

## 2024-12-20 DIAGNOSIS — K35.30 ACUTE APPENDICITIS WITH LOCALIZED PERITONITIS, WITHOUT PERFORATION, ABSCESS, OR GANGRENE: Primary | ICD-10-CM

## 2024-12-20 PROBLEM — K35.80 ACUTE APPENDICITIS: Status: RESOLVED | Noted: 2024-12-20 | Resolved: 2024-12-20

## 2024-12-20 PROBLEM — K35.80 ACUTE APPENDICITIS: Status: ACTIVE | Noted: 2024-12-20

## 2024-12-20 PROCEDURE — 96376 TX/PRO/DX INJ SAME DRUG ADON: CPT

## 2024-12-20 PROCEDURE — 63600175 PHARM REV CODE 636 W HCPCS: Performed by: NURSE ANESTHETIST, CERTIFIED REGISTERED

## 2024-12-20 PROCEDURE — 37000008 HC ANESTHESIA 1ST 15 MINUTES: Performed by: SURGERY

## 2024-12-20 PROCEDURE — 37000009 HC ANESTHESIA EA ADD 15 MINS: Performed by: SURGERY

## 2024-12-20 PROCEDURE — 96375 TX/PRO/DX INJ NEW DRUG ADDON: CPT

## 2024-12-20 PROCEDURE — 36000708 HC OR TIME LEV III 1ST 15 MIN: Performed by: SURGERY

## 2024-12-20 PROCEDURE — 36000709 HC OR TIME LEV III EA ADD 15 MIN: Performed by: SURGERY

## 2024-12-20 PROCEDURE — 25000003 PHARM REV CODE 250: Performed by: PHYSICIAN ASSISTANT

## 2024-12-20 PROCEDURE — 96374 THER/PROPH/DIAG INJ IV PUSH: CPT

## 2024-12-20 PROCEDURE — 44970 LAPAROSCOPY APPENDECTOMY: CPT | Mod: ,,, | Performed by: SURGERY

## 2024-12-20 PROCEDURE — 63600175 PHARM REV CODE 636 W HCPCS: Performed by: PHYSICIAN ASSISTANT

## 2024-12-20 PROCEDURE — 99285 EMERGENCY DEPT VISIT HI MDM: CPT | Mod: 25

## 2024-12-20 PROCEDURE — 27201423 OPTIME MED/SURG SUP & DEVICES STERILE SUPPLY: Performed by: SURGERY

## 2024-12-20 PROCEDURE — 25000003 PHARM REV CODE 250: Performed by: ANESTHESIOLOGY

## 2024-12-20 PROCEDURE — G0378 HOSPITAL OBSERVATION PER HR: HCPCS

## 2024-12-20 PROCEDURE — 71000033 HC RECOVERY, INTIAL HOUR: Performed by: SURGERY

## 2024-12-20 PROCEDURE — 88304 TISSUE EXAM BY PATHOLOGIST: CPT | Performed by: STUDENT IN AN ORGANIZED HEALTH CARE EDUCATION/TRAINING PROGRAM

## 2024-12-20 PROCEDURE — 25000003 PHARM REV CODE 250: Performed by: SURGERY

## 2024-12-20 PROCEDURE — 71000039 HC RECOVERY, EACH ADD'L HOUR: Performed by: SURGERY

## 2024-12-20 PROCEDURE — 99223 1ST HOSP IP/OBS HIGH 75: CPT | Mod: 57,,, | Performed by: SURGERY

## 2024-12-20 PROCEDURE — 25000003 PHARM REV CODE 250: Performed by: NURSE ANESTHETIST, CERTIFIED REGISTERED

## 2024-12-20 RX ORDER — LISINOPRIL 20 MG/1
40 TABLET ORAL DAILY
Status: DISCONTINUED | OUTPATIENT
Start: 2024-12-21 | End: 2024-12-21 | Stop reason: HOSPADM

## 2024-12-20 RX ORDER — HYDROMORPHONE HYDROCHLORIDE 2 MG/ML
0.4 INJECTION, SOLUTION INTRAMUSCULAR; INTRAVENOUS; SUBCUTANEOUS EVERY 5 MIN PRN
Status: DISCONTINUED | OUTPATIENT
Start: 2024-12-20 | End: 2024-12-20 | Stop reason: HOSPADM

## 2024-12-20 RX ORDER — OXYCODONE AND ACETAMINOPHEN 5; 325 MG/1; MG/1
1 TABLET ORAL EVERY 4 HOURS PRN
Status: DISCONTINUED | OUTPATIENT
Start: 2024-12-20 | End: 2024-12-21 | Stop reason: HOSPADM

## 2024-12-20 RX ORDER — SODIUM CHLORIDE 0.9 % (FLUSH) 0.9 %
10 SYRINGE (ML) INJECTION
Status: DISCONTINUED | OUTPATIENT
Start: 2024-12-20 | End: 2024-12-21 | Stop reason: HOSPADM

## 2024-12-20 RX ORDER — MEPERIDINE HYDROCHLORIDE 25 MG/ML
12.5 INJECTION INTRAMUSCULAR; INTRAVENOUS; SUBCUTANEOUS ONCE AS NEEDED
Status: DISCONTINUED | OUTPATIENT
Start: 2024-12-20 | End: 2024-12-20 | Stop reason: HOSPADM

## 2024-12-20 RX ORDER — ONDANSETRON HYDROCHLORIDE 2 MG/ML
INJECTION, SOLUTION INTRAVENOUS
Status: DISCONTINUED | OUTPATIENT
Start: 2024-12-20 | End: 2024-12-20

## 2024-12-20 RX ORDER — OXYCODONE HYDROCHLORIDE 5 MG/1
5 TABLET ORAL
Status: DISCONTINUED | OUTPATIENT
Start: 2024-12-20 | End: 2024-12-20 | Stop reason: HOSPADM

## 2024-12-20 RX ORDER — PROCHLORPERAZINE EDISYLATE 5 MG/ML
5 INJECTION INTRAMUSCULAR; INTRAVENOUS EVERY 30 MIN PRN
Status: DISCONTINUED | OUTPATIENT
Start: 2024-12-20 | End: 2024-12-20 | Stop reason: HOSPADM

## 2024-12-20 RX ORDER — AMOXICILLIN 250 MG
1 CAPSULE ORAL 2 TIMES DAILY
Status: DISCONTINUED | OUTPATIENT
Start: 2024-12-20 | End: 2024-12-21 | Stop reason: HOSPADM

## 2024-12-20 RX ORDER — DEXAMETHASONE SODIUM PHOSPHATE 4 MG/ML
INJECTION, SOLUTION INTRA-ARTICULAR; INTRALESIONAL; INTRAMUSCULAR; INTRAVENOUS; SOFT TISSUE
Status: DISCONTINUED | OUTPATIENT
Start: 2024-12-20 | End: 2024-12-20

## 2024-12-20 RX ORDER — ONDANSETRON HYDROCHLORIDE 2 MG/ML
4 INJECTION, SOLUTION INTRAVENOUS EVERY 8 HOURS PRN
Status: DISCONTINUED | OUTPATIENT
Start: 2024-12-20 | End: 2024-12-21 | Stop reason: HOSPADM

## 2024-12-20 RX ORDER — ONDANSETRON HYDROCHLORIDE 2 MG/ML
4 INJECTION, SOLUTION INTRAVENOUS
Status: COMPLETED | OUTPATIENT
Start: 2024-12-20 | End: 2024-12-20

## 2024-12-20 RX ORDER — PHENYLEPHRINE HYDROCHLORIDE 10 MG/ML
INJECTION INTRAVENOUS
Status: DISCONTINUED | OUTPATIENT
Start: 2024-12-20 | End: 2024-12-20

## 2024-12-20 RX ORDER — LIDOCAINE HYDROCHLORIDE 20 MG/ML
INJECTION INTRAVENOUS
Status: DISCONTINUED | OUTPATIENT
Start: 2024-12-20 | End: 2024-12-20

## 2024-12-20 RX ORDER — ACETAMINOPHEN 325 MG/1
650 TABLET ORAL EVERY 8 HOURS PRN
Status: DISCONTINUED | OUTPATIENT
Start: 2024-12-20 | End: 2024-12-21 | Stop reason: HOSPADM

## 2024-12-20 RX ORDER — SODIUM CHLORIDE 0.9 % (FLUSH) 0.9 %
3 SYRINGE (ML) INJECTION
Status: DISCONTINUED | OUTPATIENT
Start: 2024-12-20 | End: 2024-12-20 | Stop reason: HOSPADM

## 2024-12-20 RX ORDER — PROCHLORPERAZINE EDISYLATE 5 MG/ML
5 INJECTION INTRAMUSCULAR; INTRAVENOUS EVERY 6 HOURS PRN
Status: DISCONTINUED | OUTPATIENT
Start: 2024-12-20 | End: 2024-12-21 | Stop reason: HOSPADM

## 2024-12-20 RX ORDER — SUCCINYLCHOLINE CHLORIDE 20 MG/ML
INJECTION INTRAMUSCULAR; INTRAVENOUS
Status: DISCONTINUED | OUTPATIENT
Start: 2024-12-20 | End: 2024-12-20

## 2024-12-20 RX ORDER — KETOROLAC TROMETHAMINE 30 MG/ML
10 INJECTION, SOLUTION INTRAMUSCULAR; INTRAVENOUS
Status: COMPLETED | OUTPATIENT
Start: 2024-12-20 | End: 2024-12-20

## 2024-12-20 RX ORDER — CETIRIZINE HYDROCHLORIDE 10 MG/1
10 TABLET ORAL DAILY
Status: DISCONTINUED | OUTPATIENT
Start: 2024-12-21 | End: 2024-12-21 | Stop reason: HOSPADM

## 2024-12-20 RX ORDER — FENTANYL CITRATE 50 UG/ML
INJECTION, SOLUTION INTRAMUSCULAR; INTRAVENOUS
Status: DISCONTINUED | OUTPATIENT
Start: 2024-12-20 | End: 2024-12-20

## 2024-12-20 RX ORDER — ROCURONIUM BROMIDE 10 MG/ML
INJECTION, SOLUTION INTRAVENOUS
Status: DISCONTINUED | OUTPATIENT
Start: 2024-12-20 | End: 2024-12-20

## 2024-12-20 RX ORDER — GLUCAGON 1 MG
1 KIT INJECTION
Status: DISCONTINUED | OUTPATIENT
Start: 2024-12-20 | End: 2024-12-20 | Stop reason: HOSPADM

## 2024-12-20 RX ORDER — PANTOPRAZOLE SODIUM 40 MG/1
40 TABLET, DELAYED RELEASE ORAL DAILY
Status: DISCONTINUED | OUTPATIENT
Start: 2024-12-21 | End: 2024-12-21 | Stop reason: HOSPADM

## 2024-12-20 RX ORDER — KETOROLAC TROMETHAMINE 30 MG/ML
10 INJECTION, SOLUTION INTRAMUSCULAR; INTRAVENOUS EVERY 6 HOURS PRN
Status: DISCONTINUED | OUTPATIENT
Start: 2024-12-20 | End: 2024-12-21 | Stop reason: HOSPADM

## 2024-12-20 RX ORDER — PROPOFOL 10 MG/ML
VIAL (ML) INTRAVENOUS
Status: DISCONTINUED | OUTPATIENT
Start: 2024-12-20 | End: 2024-12-20

## 2024-12-20 RX ORDER — TALC
6 POWDER (GRAM) TOPICAL NIGHTLY PRN
Status: DISCONTINUED | OUTPATIENT
Start: 2024-12-20 | End: 2024-12-21 | Stop reason: HOSPADM

## 2024-12-20 RX ORDER — MORPHINE SULFATE 4 MG/ML
4 INJECTION, SOLUTION INTRAMUSCULAR; INTRAVENOUS EVERY 4 HOURS PRN
Status: DISCONTINUED | OUTPATIENT
Start: 2024-12-20 | End: 2024-12-21 | Stop reason: HOSPADM

## 2024-12-20 RX ADMIN — KETOROLAC TROMETHAMINE 10 MG: 30 INJECTION, SOLUTION INTRAMUSCULAR; INTRAVENOUS at 04:12

## 2024-12-20 RX ADMIN — CARBOXYMETHYLCELLULOSE SODIUM 2 DROP: 2.5 SOLUTION/ DROPS OPHTHALMIC at 06:12

## 2024-12-20 RX ADMIN — PHENYLEPHRINE HYDROCHLORIDE 200 MCG: 10 INJECTION INTRAVENOUS at 06:12

## 2024-12-20 RX ADMIN — SODIUM CHLORIDE, SODIUM LACTATE, POTASSIUM CHLORIDE, AND CALCIUM CHLORIDE: .6; .31; .03; .02 INJECTION, SOLUTION INTRAVENOUS at 06:12

## 2024-12-20 RX ADMIN — OXYCODONE HYDROCHLORIDE AND ACETAMINOPHEN 1 TABLET: 5; 325 TABLET ORAL at 10:12

## 2024-12-20 RX ADMIN — ONDANSETRON HYDROCHLORIDE 4 MG: 2 INJECTION INTRAMUSCULAR; INTRAVENOUS at 07:12

## 2024-12-20 RX ADMIN — LIDOCAINE HYDROCHLORIDE 50 MG: 20 INJECTION, SOLUTION INTRAVENOUS at 06:12

## 2024-12-20 RX ADMIN — OXYCODONE HYDROCHLORIDE 5 MG: 5 TABLET ORAL at 08:12

## 2024-12-20 RX ADMIN — FENTANYL CITRATE 100 MCG: 50 INJECTION, SOLUTION INTRAMUSCULAR; INTRAVENOUS at 06:12

## 2024-12-20 RX ADMIN — ONDANSETRON 4 MG: 2 INJECTION INTRAMUSCULAR; INTRAVENOUS at 08:12

## 2024-12-20 RX ADMIN — SUCCINYLCHOLINE CHLORIDE 180 MG: 20 INJECTION, SOLUTION INTRAMUSCULAR; INTRAVENOUS at 06:12

## 2024-12-20 RX ADMIN — SENNOSIDES AND DOCUSATE SODIUM 1 TABLET: 50; 8.6 TABLET ORAL at 10:12

## 2024-12-20 RX ADMIN — ONDANSETRON 4 MG: 2 INJECTION INTRAMUSCULAR; INTRAVENOUS at 04:12

## 2024-12-20 RX ADMIN — SUGAMMADEX 200 MG: 100 INJECTION, SOLUTION INTRAVENOUS at 07:12

## 2024-12-20 RX ADMIN — DEXAMETHASONE SODIUM PHOSPHATE 4 MG: 4 INJECTION, SOLUTION INTRAMUSCULAR; INTRAVENOUS at 06:12

## 2024-12-20 RX ADMIN — PROPOFOL 200 MG: 10 INJECTION, EMULSION INTRAVENOUS at 06:12

## 2024-12-20 RX ADMIN — ROCURONIUM BROMIDE 10 MG: 10 INJECTION, SOLUTION INTRAVENOUS at 06:12

## 2024-12-20 RX ADMIN — ROCURONIUM BROMIDE 40 MG: 10 INJECTION, SOLUTION INTRAVENOUS at 06:12

## 2024-12-20 RX ADMIN — LIDOCAINE HYDROCHLORIDE 50 MG: 20 INJECTION, SOLUTION INTRAVENOUS at 07:12

## 2024-12-20 RX ADMIN — PIPERACILLIN SODIUM AND TAZOBACTAM SODIUM 4.5 G: 4; .5 INJECTION, POWDER, LYOPHILIZED, FOR SOLUTION INTRAVENOUS at 07:12

## 2024-12-20 NOTE — H&P
History & Physical    SUBJECTIVE:     History of Present Illness:  Aisha Escobedo is a 29 y.o. female who was transferred from outside facility for evaluation of lower abdominal pain that began yesterday as generalized lower abdominal pain.  She has mild anorexia.  Pain localized to the right lower quadrant and has remained constant  She states symptoms have been constant since onset.  She was found to have acute appendicitis on outside CT.  At that time she reported associated constipation and nausea.  At this time she reports pain is present but possibly less so.  Nausea after the ambulance ride     ED course with no leukocytosis.  Received Zosyn at outside hospital.    Review of patient's allergies indicates:   Allergen Reactions    Nitrofurantoin monohyd/m-cryst        Current Facility-Administered Medications   Medication Dose Route Frequency Provider Last Rate Last Admin    piperacillin-tazobactam (ZOSYN) 4.5 g in D5W 100 mL IVPB (MB+)  4.5 g Intravenous Q6H Kate Yancey PA         Current Outpatient Medications   Medication Sig Dispense Refill    cetirizine (ZYRTEC) 10 MG tablet Take 1 tablet by mouth once daily.      ketoconazole (NIZORAL) 2 % shampoo Can Shampoo hair up to 3 nights per week PRN Dandruff / Flakes Leave on for 10 minutes, then rinse out. Repeat as needed for up to 2 months      linaCLOtide (LINZESS) 145 mcg Cap capsule Take 1 capsule (145 mcg total) by mouth before breakfast. 30 capsule 11    lisinopriL (PRINIVIL,ZESTRIL) 40 MG tablet Take 1 tablet by mouth once daily.      pantoprazole (PROTONIX) 40 MG tablet Take 1 tablet (40 mg total) by mouth once daily. 30 tablet 11     Facility-Administered Medications Ordered in Other Encounters   Medication Dose Route Frequency Provider Last Rate Last Admin    0.9%  NaCl infusion   Intravenous Continuous Michele Guerrero MD 10 mL/hr at 03/06/24 0731 New Bag at 03/06/24 0731    LIDOcaine (PF) 10 mg/ml (1%) injection 10 mg   1 mL Intradermal Once PRN Michele Guerrero MD           Past Medical History:   Diagnosis Date    colitis     Delivery with history of      Depression     Hypertension      Past Surgical History:   Procedure Laterality Date     SECTION      DILATION AND CURETTAGE OF UTERUS      EGD, WITH CLOSED BIOPSY  2023    EGD, WITH CLOSED BIOPSY  2024    ESOPHAGOGASTRODUODENOSCOPY N/A 2023    Procedure: EGD (ESOPHAGOGASTRODUODENOSCOPY);  Surgeon: Michele Guerrero MD;  Location: [facility];  Service: Endoscopy;  Laterality: N/A;    ESOPHAGOGASTRODUODENOSCOPY N/A 3/6/2024    Procedure: EGD (ESOPHAGOGASTRODUODENOSCOPY);  Surgeon: Michele Guerrero MD;  Location: [facility];  Service: Endoscopy;  Laterality: N/A;     Family History   Problem Relation Name Age of Onset    Colon cancer Paternal Grandmother      Breast cancer Paternal Grandmother      Breast cancer Maternal Grandmother      Ovarian cancer Maternal Grandmother       Social History     Tobacco Use    Smoking status: Never    Smokeless tobacco: Never   Substance Use Topics    Alcohol use: No    Drug use: No        Review of Systems:  Review of Systems   Constitutional:  Negative for appetite change, fatigue, fever and unexpected weight change.   HENT:  Negative for sore throat and trouble swallowing.    Eyes: Negative.    Respiratory:  Negative for cough, shortness of breath and wheezing.    Cardiovascular:  Negative for chest pain and leg swelling.   Gastrointestinal:  Negative for abdominal distention, blood in stool, constipation, diarrhea, nausea and vomiting.        Exquisite right lower quadrant tenderness   Endocrine: Negative.    Genitourinary: Negative.    Musculoskeletal:  Negative for back pain.   Skin: Negative.  Negative for rash.   Allergic/Immunologic: Negative.    Neurological: Negative.    Hematological: Negative.    Psychiatric/Behavioral:  Negative for confusion.        OBJECTIVE:     Vital  Signs (Most Recent)  Temp: 97.8 °F (36.6 °C) (12/20/24 1623)  Pulse: (!) 59 (12/20/24 1623)  Resp: 16 (12/20/24 1602)  BP: 115/70 (12/20/24 1623)  SpO2: 100 % (12/20/24 1623)     103 kg (227 lb)     Physical Exam:  Physical Exam  Constitutional:       General: She is not in acute distress.     Appearance: Normal appearance.   Eyes:      General: No scleral icterus.  Cardiovascular:      Rate and Rhythm: Normal rate and regular rhythm.   Pulmonary:      Breath sounds: Normal breath sounds.   Abdominal:      General: There is no distension.      Palpations: Abdomen is soft.      Tenderness: There is no abdominal tenderness.      Comments: Exquisite right lower quadrant tenderness   Musculoskeletal:         General: Normal range of motion.      Cervical back: Neck supple.   Lymphadenopathy:      Cervical: No cervical adenopathy.   Skin:     General: Skin is warm and dry.      Findings: No rash.   Neurological:      General: No focal deficit present.      Mental Status: She is alert.   Psychiatric:         Mood and Affect: Mood normal.         Laboratory  CBC: Reviewed  CMP: Reviewed    Diagnostic Results:  CT: Reviewed      ASSESSMENT/PLAN:     Acute appendicitis    Signs and symptoms consistent with acute appendicitis including CT scan which reveals distended appendix with periappendiceal fat stranding.  No evidence of perforation at this time    PLAN:Plan     Laparoscopic appendectomy    Patient understands reasons for conversion to laparoscopic or open procedure    The general risks of the operation were described to the patient in detail and included on an informed consent sheet, which I reviewed with the patientand which the patient has signed.  Illustrations were provided for clarity.  The patient was offered the opportunity to ask questions, on multiple occasions, and after continued discussion had none additional.  The patient is ready to proceed with the operation.

## 2024-12-20 NOTE — ED PROVIDER NOTES
Source of History:  Patient in medical chart    Chief complaint:  Abdominal Pain (Pt transfer from [facility] for appendicitis )      HPI:  Aisha Escobedo is a 29 y.o. female presenting with abdominal pain.  Patient was transferred from outside facility for evaluation of lower abdominal pain that began yesterday.  She states symptoms have been constant since onset.  She was found to have acute appendicitis on outside CT.  At that time she reported associated constipation and nausea.  At this time she reports no significant pain.    ED course with no leukocytosis.  Received Zosyn at outside hospital.  Will discuss with general surgery    This is the extent to the patients complaints today here in the emergency department.    ROS: As per HPI     Review of patient's allergies indicates:   Allergen Reactions    Nitrofurantoin monohyd/m-cryst        PMH:  As per HPI and below:  Past Medical History:   Diagnosis Date    colitis     Delivery with history of      Depression     Hypertension      Past Surgical History:   Procedure Laterality Date     SECTION      DILATION AND CURETTAGE OF UTERUS      EGD, WITH CLOSED BIOPSY  2023    EGD, WITH CLOSED BIOPSY  2024    ESOPHAGOGASTRODUODENOSCOPY N/A 2023    Procedure: EGD (ESOPHAGOGASTRODUODENOSCOPY);  Surgeon: Michele Guerrero MD;  Location: [facility];  Service: Endoscopy;  Laterality: N/A;    ESOPHAGOGASTRODUODENOSCOPY N/A 3/6/2024    Procedure: EGD (ESOPHAGOGASTRODUODENOSCOPY);  Surgeon: Michele Guerrero MD;  Location: [facility];  Service: Endoscopy;  Laterality: N/A;       Social History     Tobacco Use    Smoking status: Never    Smokeless tobacco: Never   Substance Use Topics    Alcohol use: No    Drug use: No       Physical Exam:    /70 (BP Location: Left arm)   Pulse (!) 59   Temp 97.8 °F (36.6 °C)   Resp 16   LMP 2024 Comment: States vaginal birth 24  SpO2 100%    Breastfeeding No   Nursing note and vital signs reviewed.  Constitutional: No acute distress.  Nontoxic  Eyes: No conjunctival injection.Extraocular muscles are intact.  ENT: Oropharynx clear.  Normal phonation.   Cardiovascular: Regular rate and rhythm.  No murmurs. No gallops. No rubs  Respiratory: Clear to auscultation bilaterally.  Good air movement.  No wheezes.  No rhonchi. No rales. No accessory muscle use..  Abdomen: Soft.  Not distended.  Nontender.  No guarding.  No rebound. Non-peritoneal. TTP of right lower quadrant with some guarding.  No rebound or rigidity.  Musculoskeletal: Good range of motion all joints.  No deformities.  Neck supple.  No meningismus.  Skin: No rashes seen.  Good turgor.  No abrasions.  No ecchymoses.  Neurologic: Motor intact.  Sensation intact.  No ataxia. No focal neurological deficits.  Psych: Appropriate, conversant    Labs that have been ordered have been independently reviewed and interpreted by myself.    I decided to obtain the patient's medical records.      MDM/ Differential Dx:    Aisha Escobedo 29 y.o. presented to the ED with c/o abdominal pain Physical exam reveals nontoxic-appearing female with tenderness palpation of the right lower quadrant.  No rebound or rigidity.    DDX:  Acute appendicitis, colitis, chronic abdominal pain.    ED management:  Patient with recurrence and pain.  Will address with analgesics in addition to antiemetics.  Due for additional dosing of Zosyn at 6.  This will be ordered.    ED Course as of 12/20/24 1658   Fri Dec 20, 2024   1617 General surgery at bedside   [LC]      ED Course User Index  [LC] Kate Yancey PA     General surgery consented patient will likely complete surgery tonight.  Stable at time of admission    Impression/Plan: Patient informed of diagnosis The encounter diagnosis was Acute appendicitis with localized peritonitis, without perforation, abscess, or gangrene.       Results for orders placed or  performed during the hospital encounter of 12/20/24   CBC W/ AUTO DIFFERENTIAL    Collection Time: 12/20/24  9:40 AM   Result Value Ref Range    WBC 8.58 3.90 - 12.70 K/uL    RBC 4.14 4.00 - 5.40 M/uL    Hemoglobin 10.9 (L) 12.0 - 16.0 g/dL    Hematocrit 35.6 (L) 37.0 - 48.5 %    MCV 86 82 - 98 fL    MCH 26.3 (L) 27.0 - 31.0 pg    MCHC 30.6 (L) 32.0 - 36.0 g/dL    RDW 14.7 (H) 11.5 - 14.5 %    Platelets 263 150 - 450 K/uL    MPV 10.8 9.2 - 12.9 fL    Immature Granulocytes 0.5 0.0 - 0.5 %    Gran # (ANC) 6.0 1.8 - 7.7 K/uL    Immature Grans (Abs) 0.04 0.00 - 0.04 K/uL    Lymph # 1.9 1.0 - 4.8 K/uL    Mono # 0.5 0.3 - 1.0 K/uL    Eos # 0.2 0.0 - 0.5 K/uL    Baso # 0.02 0.00 - 0.20 K/uL    nRBC 0 0 /100 WBC    Gran % 69.7 38.0 - 73.0 %    Lymph % 21.9 18.0 - 48.0 %    Mono % 5.8 4.0 - 15.0 %    Eosinophil % 1.9 0.0 - 8.0 %    Basophil % 0.2 0.0 - 1.9 %    Differential Method Automated    Comp. Metabolic Panel    Collection Time: 12/20/24  9:40 AM   Result Value Ref Range    Sodium 137 136 - 145 mmol/L    Potassium 4.0 3.5 - 5.1 mmol/L    Chloride 106 95 - 110 mmol/L    CO2 23 23 - 29 mmol/L    Glucose 95 70 - 110 mg/dL    BUN 17 6 - 20 mg/dL    Creatinine 0.9 0.5 - 1.4 mg/dL    Calcium 8.9 8.7 - 10.5 mg/dL    Total Protein 6.9 6.0 - 8.4 g/dL    Albumin 3.4 (L) 3.5 - 5.2 g/dL    Total Bilirubin 0.2 0.1 - 1.0 mg/dL    Alkaline Phosphatase 89 40 - 150 U/L    AST 15 10 - 40 U/L    ALT 18 10 - 44 U/L    eGFR >60.0 >60 mL/min/1.73 m^2    Anion Gap 8 8 - 16 mmol/L   Lipase    Collection Time: 12/20/24  9:40 AM   Result Value Ref Range    Lipase 21 4 - 60 U/L   Urinalysis, Reflex to Urine Culture Urine, Clean Catch    Collection Time: 12/20/24  9:42 AM    Specimen: Urine   Result Value Ref Range    Specimen UA Urine, Clean Catch     Color, UA Yellow Yellow, Straw, Liza    Appearance, UA Clear Clear    pH, UA 5.0 5.0 - 8.0    Specific Gravity, UA 1.015 1.005 - 1.030    Protein, UA Negative Negative    Glucose, UA Negative  Negative    Ketones, UA Negative Negative    Bilirubin (UA) Negative Negative    Occult Blood UA Negative Negative    Nitrite, UA Negative Negative    Urobilinogen, UA Negative Negative EU/dL    Leukocytes, UA Negative Negative   POCT urine pregnancy    Collection Time: 12/20/24  9:44 AM   Result Value Ref Range    POC Preg Test, Ur Negative Negative     Acceptable Yes      Imaging Results    None          Diagnostic Impression:    1. Acute appendicitis with localized peritonitis, without perforation, abscess, or gangrene         ED Disposition Condition    Observation Stable             Kate Yancey PA  12/20/24 8795

## 2024-12-20 NOTE — ANESTHESIA PREPROCEDURE EVALUATION
12/20/2024  Aisha Escobedo is a 29 y.o., female.      Pre-op Assessment    I have reviewed the Patient Summary Reports.     I have reviewed the Nursing Notes. I have reviewed the NPO Status.   I have reviewed the Medications.     Review of Systems  Anesthesia Hx:  No problems with previous Anesthesia             Denies Family Hx of Anesthesia complications.    Denies Personal Hx of Anesthesia complications.                    Social:  Non-Smoker       Hematology/Oncology:  Hematology Normal   Oncology Normal                                   EENT/Dental:  EENT/Dental Normal           Cardiovascular:  Exercise tolerance: good   Hypertension                                          Pulmonary:  Pulmonary Normal                       Renal/:  Renal/ Normal                 Hepatic/GI:     GERD                Musculoskeletal:  Musculoskeletal Normal                Neurological:  Neurology Normal                                      Endocrine:  Endocrine Normal          Morbid Obesity / BMI > 40  Dermatological:  Skin Normal    Psych:    depression                Physical Exam  General: Well nourished, Cooperative, Oriented and Alert    Airway:  Mallampati: II / II  Mouth Opening: Normal  TM Distance: Normal  Neck ROM: Normal ROM    Dental:  Intact        Anesthesia Plan  Type of Anesthesia, risks & benefits discussed:    Anesthesia Type: Gen ETT  Intra-op Monitoring Plan: Standard ASA Monitors  Post Op Pain Control Plan: multimodal analgesia  Induction:  IV and rapid sequence  Airway Plan: Video and Direct  Informed Consent: Informed consent signed with the Patient and all parties understand the risks and agree with anesthesia plan.  All questions answered.   ASA Score: 3 Emergent  Day of Surgery Review of History & Physical: H&P Update referred to the surgeon/provider.    Ready For Surgery From  Anesthesia Perspective.     .

## 2024-12-20 NOTE — ED NOTES
Pt Belongings  Glasses  Cellphone- Iphone  Apple watch  Black pants  Black Jacket  Beige pair of slides (shoes)  Pair of black socks  Blue Tshirt  Garcia Purse  Brown Sports Bra

## 2024-12-21 VITALS
TEMPERATURE: 98 F | HEIGHT: 62 IN | OXYGEN SATURATION: 99 % | SYSTOLIC BLOOD PRESSURE: 143 MMHG | HEART RATE: 65 BPM | DIASTOLIC BLOOD PRESSURE: 83 MMHG | RESPIRATION RATE: 18 BRPM | WEIGHT: 227.06 LBS | BODY MASS INDEX: 41.78 KG/M2

## 2024-12-21 PROBLEM — K35.30 ACUTE APPENDICITIS WITH LOCALIZED PERITONITIS, WITHOUT PERFORATION, ABSCESS, OR GANGRENE: Status: RESOLVED | Noted: 2024-12-21 | Resolved: 2024-12-21

## 2024-12-21 PROBLEM — K35.30 ACUTE APPENDICITIS WITH LOCALIZED PERITONITIS, WITHOUT PERFORATION, ABSCESS, OR GANGRENE: Status: ACTIVE | Noted: 2024-12-21

## 2024-12-21 LAB
ALBUMIN SERPL BCP-MCNC: 3.2 G/DL (ref 3.5–5.2)
ALP SERPL-CCNC: 84 U/L (ref 40–150)
ALT SERPL W/O P-5'-P-CCNC: 20 U/L (ref 10–44)
ANION GAP SERPL CALC-SCNC: 10 MMOL/L (ref 8–16)
AST SERPL-CCNC: 16 U/L (ref 10–40)
BASOPHILS # BLD AUTO: 0.02 K/UL (ref 0–0.2)
BASOPHILS NFR BLD: 0.2 % (ref 0–1.9)
BILIRUB SERPL-MCNC: 0.4 MG/DL (ref 0.1–1)
BUN SERPL-MCNC: 17 MG/DL (ref 6–20)
CALCIUM SERPL-MCNC: 8.5 MG/DL (ref 8.7–10.5)
CHLORIDE SERPL-SCNC: 106 MMOL/L (ref 95–110)
CO2 SERPL-SCNC: 18 MMOL/L (ref 23–29)
CREAT SERPL-MCNC: 0.9 MG/DL (ref 0.5–1.4)
DIFFERENTIAL METHOD BLD: ABNORMAL
EOSINOPHIL # BLD AUTO: 0 K/UL (ref 0–0.5)
EOSINOPHIL NFR BLD: 0 % (ref 0–8)
ERYTHROCYTE [DISTWIDTH] IN BLOOD BY AUTOMATED COUNT: 14.8 % (ref 11.5–14.5)
EST. GFR  (NO RACE VARIABLE): >60 ML/MIN/1.73 M^2
GLUCOSE SERPL-MCNC: 142 MG/DL (ref 70–110)
HCT VFR BLD AUTO: 33.3 % (ref 37–48.5)
HGB BLD-MCNC: 10.4 G/DL (ref 12–16)
IMM GRANULOCYTES # BLD AUTO: 0.06 K/UL (ref 0–0.04)
IMM GRANULOCYTES NFR BLD AUTO: 0.5 % (ref 0–0.5)
LYMPHOCYTES # BLD AUTO: 1.3 K/UL (ref 1–4.8)
LYMPHOCYTES NFR BLD: 11.1 % (ref 18–48)
MCH RBC QN AUTO: 26.7 PG (ref 27–31)
MCHC RBC AUTO-ENTMCNC: 31.2 G/DL (ref 32–36)
MCV RBC AUTO: 86 FL (ref 82–98)
MONOCYTES # BLD AUTO: 0.3 K/UL (ref 0.3–1)
MONOCYTES NFR BLD: 2.2 % (ref 4–15)
NEUTROPHILS # BLD AUTO: 10.2 K/UL (ref 1.8–7.7)
NEUTROPHILS NFR BLD: 86 % (ref 38–73)
NRBC BLD-RTO: 0 /100 WBC
PLATELET # BLD AUTO: 291 K/UL (ref 150–450)
PMV BLD AUTO: 10.6 FL (ref 9.2–12.9)
POTASSIUM SERPL-SCNC: 4.6 MMOL/L (ref 3.5–5.1)
PROT SERPL-MCNC: 6.6 G/DL (ref 6–8.4)
RBC # BLD AUTO: 3.89 M/UL (ref 4–5.4)
SODIUM SERPL-SCNC: 134 MMOL/L (ref 136–145)
WBC # BLD AUTO: 11.89 K/UL (ref 3.9–12.7)

## 2024-12-21 PROCEDURE — 25000003 PHARM REV CODE 250: Performed by: PHYSICIAN ASSISTANT

## 2024-12-21 PROCEDURE — 63600175 PHARM REV CODE 636 W HCPCS: Performed by: PHYSICIAN ASSISTANT

## 2024-12-21 PROCEDURE — 85025 COMPLETE CBC W/AUTO DIFF WBC: CPT | Performed by: PHYSICIAN ASSISTANT

## 2024-12-21 PROCEDURE — G0378 HOSPITAL OBSERVATION PER HR: HCPCS

## 2024-12-21 PROCEDURE — 80053 COMPREHEN METABOLIC PANEL: CPT | Performed by: PHYSICIAN ASSISTANT

## 2024-12-21 PROCEDURE — 36415 COLL VENOUS BLD VENIPUNCTURE: CPT | Performed by: PHYSICIAN ASSISTANT

## 2024-12-21 PROCEDURE — 25000003 PHARM REV CODE 250: Performed by: SURGERY

## 2024-12-21 RX ORDER — OXYCODONE AND ACETAMINOPHEN 5; 325 MG/1; MG/1
1 TABLET ORAL EVERY 4 HOURS PRN
Qty: 8 TABLET | Refills: 0 | Status: SHIPPED | OUTPATIENT
Start: 2024-12-21

## 2024-12-21 RX ADMIN — OXYCODONE HYDROCHLORIDE AND ACETAMINOPHEN 1 TABLET: 5; 325 TABLET ORAL at 02:12

## 2024-12-21 RX ADMIN — OXYCODONE HYDROCHLORIDE AND ACETAMINOPHEN 1 TABLET: 5; 325 TABLET ORAL at 12:12

## 2024-12-21 RX ADMIN — PIPERACILLIN SODIUM AND TAZOBACTAM SODIUM 4.5 G: 4; .5 INJECTION, POWDER, LYOPHILIZED, FOR SOLUTION INTRAVENOUS at 02:12

## 2024-12-21 RX ADMIN — PANTOPRAZOLE SODIUM 40 MG: 40 TABLET, DELAYED RELEASE ORAL at 08:12

## 2024-12-21 NOTE — TRANSFER OF CARE
Anesthesia Transfer of Care Note    Patient: Aisha Escobedo    Procedure(s) Performed: Procedure(s) (LRB):  APPENDECTOMY, LAPAROSCOPIC (N/A)    Patient location: PACU    Anesthesia Type: general    Transport from OR: Transported from OR on room air with adequate spontaneous ventilation    Post pain: adequate analgesia    Post assessment: no apparent anesthetic complications and tolerated procedure well    Post vital signs: stable    Level of consciousness: awake    Nausea/Vomiting: no nausea/vomiting    Complications: none    Transfer of care protocol was followed      Last vitals: Visit Vitals  /70 (BP Location: Left arm)   Pulse (!) 59   Temp 36.6 °C (97.8 °F)   Resp 16   Wt 103 kg (227 lb)   LMP 02/08/2024   SpO2 100%   Breastfeeding No   BMI 41.52 kg/m²

## 2024-12-21 NOTE — ANESTHESIA POSTPROCEDURE EVALUATION
Anesthesia Post Evaluation    Patient: Aisha Escobedo    Procedure(s) Performed: Procedure(s) (LRB):  APPENDECTOMY, LAPAROSCOPIC (N/A)    Final Anesthesia Type: general      Patient location during evaluation: PACU  Patient participation: Yes- Able to Participate  Level of consciousness: awake and alert  Post-procedure vital signs: reviewed and stable  Pain management: adequate  Airway patency: patent    PONV status at discharge: No PONV  Anesthetic complications: no      Cardiovascular status: blood pressure returned to baseline  Respiratory status: unassisted  Hydration status: euvolemic  Follow-up not needed.              Vitals Value Taken Time   /64 12/20/24 1946   Temp 36.2 °C (97.2 °F) 12/20/24 1926   Pulse 56 12/20/24 1946   Resp 16 12/20/24 1926   SpO2 97 % 12/20/24 1946   Vitals shown include unfiled device data.      No case tracking events are documented in the log.      Pain/Harley Score: Pain Rating Prior to Med Admin: 7 (12/20/2024  4:38 PM)  Harley Score: 9 (12/20/2024  7:26 PM)

## 2024-12-21 NOTE — PLAN OF CARE
Free from falls, injury, or skin breakdown this hospital admission. Pt eager & in agreement w/ DC. VU of DC instructions and the need to attend f/u appointment--paperwork passed & explained. Prescription e-scribed. IV removed w/ cath tip intact, WNL. Voiding, ambulating, & tolerating PO well. To be DCd home w/ family--will be escorted downstairs  once dressed, ready & ride arrives.  Pt discharged in no distress.

## 2024-12-21 NOTE — BRIEF OP NOTE
[facility] Surg ([address])  Brief Operative Note    Surgery Date: 12/20/2024     Surgeons and Role:     * Bowen Thompson MD - Primary    Assisting Surgeon: None    Pre-op Diagnosis:  Acute appendicitis with localized peritonitis, without perforation, abscess, or gangrene [K35.30]    Post-op Diagnosis:  Post-Op Diagnosis Codes:     * Acute appendicitis with localized peritonitis, without perforation, abscess, or gangrene [K35.30]    Procedure(s) (LRB):  APPENDECTOMY, LAPAROSCOPIC (N/A)    Anesthesia: General/Regional    Operative Findings:  Inflamed appendix with exudate but no necrosis or perforation    Estimated Blood Loss: * No values recorded between 12/20/2024  6:28 PM and 12/20/2024  7:26 PM *         Specimens:   Specimen (24h ago, onward)       Start     Ordered    12/20/24 1859  Specimen to Pathology, Surgery General Surgery  Once        Comments: Pre-op Diagnosis: Acute appendicitis with localized peritonitis, without perforation, abscess, or gangrene [K35.30]Procedure(s):APPENDECTOMY, LAPAROSCOPIC Number of specimens: 1Name of specimens: 1) Appendix     References:    Click here for ordering Quick Tip   Question Answer Comment   Procedure Type: General Surgery    Specimen Class: Routine/Screening    Release to patient Immediate        12/20/24 1858                      Discharge Note    OUTCOME: Patient tolerated treatment/procedure well without complication and is now ready for discharge.    Postop day 1 status post laparoscopic appendectomy.  Patient is tolerating liquids and without nausea.  Abdomen is soft tender only at incisions and nondistended.  She is ready for discharge home.    Afebrile vital signs    DISPOSITION: Home or Self Care    FINAL DIAGNOSIS:  Acute appendicitis    FOLLOWUP: In clinic 2 weeks    DISCHARGE INSTRUCTIONS:    Discharge Procedure Orders   Diet general     Lifting restrictions   Order Comments: No lifting greater than 15 lb for 4 weeks     No dressing needed   Order  Comments: Do not remove Steri-Strips.  Patient may shower in 2 days without covering dressings.     Call MD for:  temperature >100.4     Call MD for:  persistent nausea and vomiting     Call MD for:  severe uncontrolled pain     Call MD for:  difficulty breathing, headache or visual disturbances     Call MD for:  redness, tenderness, or signs of infection (pain, swelling, redness, odor or green/yellow discharge around incision site)

## 2024-12-21 NOTE — OP NOTE
DATE OF PROCEDURE: 12/20/2024      PREOPERATIVE DIAGNOSIS: Acute appendicitis  POSTOPERATIVE DIAGNOSIS: Acute appendicitis.     PROCEDURE PERFORMED: Laparoscopic appendectomy    ATTENDING SURGEON: Bowen Thompson MD.       ANESTHESIA: General endotracheal.     ESTIMATED BLOOD LOSS: 5 mL.     FINDINGS: Acute non- perforated appendicitis with exudate    SPECIMENs: Appendix.     DRAINS: None.     COMPLICATIONS: None.       OPERATIVE PROCEDURE: The patient was identified in Preoperative Holding and  brought back to the Operating Room. Placed supine on the operating table and padded appropriately. Monitors were applied and there was smooth induction of general endotracheal anesthesia.  The patient's abdomen was prepped and draped in the standard sterile surgical fashion. A time-out was performed and all team members present agreed this was the correct procedure on the correct patient. We also confirmed administration of appropriate preoperative antibiotics.    A #12 Kevin port was placed into the peritoneal cavity using an open cutdown technique visualizing all layers at the level of the umbilicus.  The abdomen was insufflated with carbon dioxide to a maximum pressure of 15 mmHg. A 5-mm laparoscope was placed and the abdomen was examined. There was no evidence of injury from the initial trocar placement. Two 5-mm bladeless ports were placed under direct vision atraumatically in the lower midline.  We directed our attention to the right lower quadrant. The appendix was identified and noted to have inflammatory change without evidence of perforation.  There was some exudate around the appendix.  The appendix was elevated. The mesoappendix was then divided with multiple passes of the harmonic scalpel away from other structures.  As we approached the base of the appendix , blunt dissection precedes division with the harmonic scalpel.  The appendix was now free except for its attachment to the cecum.  Endo-OSWALDO stapler with a  blue (45-3.5) load was placed across the tip of the cecum just above the appendix.  It was clamped and held in place for 1 minute and then fired.  The appendix was placed into an Endocatch bag and removed from the umbilical site. We returned the laparoscope and trocar to the umbilicus and reexamined the right lower quadrant.  Pressure was reduced to almost 0.  The mesoappendix stump was examined and no bleeding was noted. The staple line was examined and appeared viable, hemostatic and well-sealed. The right lower quadrant was suction irrigated as well as the pelvis.  All aspirates were clear and there is no sign of bleeding.. All ports were removed under direct vision and no bleeding from any port site was noted. The insufflation of the abdomen was evacuated and the laparoscope was removed. The fascial incision at the umbilical port site was closed with 0 PDS stitch in a figure of eight fashion.  Incisions were closed in a subcuticular fashion with 4-0 Vicryl.  Steri-Strips were placed. The patient was extubated in the Operating Room and transported to the Recovery Room in stable condition. All sponge, instrument and needle counts were correct at the end of the case. I was present and scrubbed for the entire procedure.

## 2024-12-21 NOTE — PLAN OF CARE
Assessment completed with patient at bedside---patient alert and oriented. Patient denies HH, DME, dialysis or coumadin. Patient takes meds as needed and significant other will transport patient home.     [facility] ([address])  Initial Discharge Assessment       Primary Care Provider: Melissa Chavez NP    Admission Diagnosis: Acute appendicitis with localized peritonitis, without perforation, abscess, or gangrene [K35.30]    Admission Date: 12/20/2024  Expected Discharge Date:     Transition of Care Barriers: None    Payor: MEDICAID / Plan: [facility] COMMUNITY PLAN [facility] ([place] MEDICAID) / Product Type: Managed Medicaid /     Extended Emergency Contact Information  Primary Emergency Contact: Kristine Escobedo  Address: [address] [place] [address] [place] [address] [facility]  Home Phone: [phone]  Mobile Phone: [phone]  Relation: Mother    Discharge Plan A: Home with family  Discharge Plan B: Home with family      [facility] [address] [facility]  [address] [facility] [address]  Phone: [phone] Fax: [phone]      Initial Assessment (most recent)       Adult Discharge Assessment - 12/21/24 0929          Discharge Assessment    Assessment Type Discharge Planning Assessment     Confirmed/corrected address, phone number and insurance Yes     Confirmed Demographics Correct on Facesheet     Source of Information patient     Communicated NGA with patient/caregiver Date not available/Unable to determine     People in Home significant other     Do you expect to return to your current living situation? Yes     Do you have help at home or someone to help you manage your care at home? No     Prior to hospitilization cognitive status: Alert/Oriented     Current cognitive status: Alert/Oriented     Walking or Climbing Stairs Difficulty no     Dressing/Bathing Difficulty no     Equipment Currently Used at Home none     Readmission within  30 days? No     Patient currently being followed by outpatient case management? No     Do you currently have service(s) that help you manage your care at home? No     Do you take prescription medications? Yes     Do you have prescription coverage? Yes     Coverage Medicaid     Do you have any problems affording any of your prescribed medications? No     Is the patient taking medications as prescribed? yes     Who is going to help you get home at discharge? family     How do you get to doctors appointments? family or friend will provide     Are you on dialysis? No     Do you take coumadin? No     Discharge Plan A Home with family     Discharge Plan B Home with family     DME Needed Upon Discharge  none     Discharge Plan discussed with: Patient     Transition of Care Barriers None        Physical Activity    On average, how many days per week do you engage in moderate to strenuous exercise (like a brisk walk)? 0 days     On average, how many minutes do you engage in exercise at this level? 0 min        Financial Resource Strain    How hard is it for you to pay for the very basics like food, housing, medical care, and heating? Not hard at all        Housing Stability    In the last 12 months, was there a time when you were not able to pay the mortgage or rent on time? No     At any time in the past 12 months, were you homeless or living in a shelter (including now)? No        Transportation Needs    Has the lack of transportation kept you from medical appointments, meetings, work or from getting things needed for daily living? No        Food Insecurity    Within the past 12 months, you worried that your food would run out before you got the money to buy more. Never true     Within the past 12 months, the food you bought just didn't last and you didn't have money to get more. Never true        Stress    Do you feel stress - tense, restless, nervous, or anxious, or unable to sleep at night because your mind is troubled  all the time - these days? Not at all        Social Isolation    How often do you feel lonely or isolated from those around you?  Never        Alcohol Use    Q1: How often do you have a drink containing alcohol? Never     Q2: How many drinks containing alcohol do you have on a typical day when you are drinking? Patient does not drink     Q3: How often do you have six or more drinks on one occasion? Never        Utilities    In the past 12 months has the electric, gas, oil, or water company threatened to shut off services in your home? No        Health Literacy    How often do you need to have someone help you when you read instructions, pamphlets, or other written material from your doctor or pharmacy? Never        OTHER    Name(s) of People in Home BF and dependent kids

## 2024-12-25 LAB
FINAL PATHOLOGIC DIAGNOSIS: NORMAL
GROSS: NORMAL
Lab: NORMAL

## 2025-07-14 ENCOUNTER — PATIENT MESSAGE (OUTPATIENT)
Dept: OBSTETRICS AND GYNECOLOGY | Facility: CLINIC | Age: 30
End: 2025-07-14

## (undated) DEVICE — SUT VICRYL+ 27 UR-6 VIOL

## (undated) DEVICE — IRRIGATOR ENDOSCOPY DISP.

## (undated) DEVICE — SUT VICRYL 0 27 CT-2

## (undated) DEVICE — SOL POVIDONE SCRUB IODINE 4 OZ

## (undated) DEVICE — TROCAR ENDOPATH XCEL 12X100MM

## (undated) DEVICE — STAPLER INT LINEAR ARTC 3.5-45

## (undated) DEVICE — TOWEL OR NONABSORB ADH 17X26

## (undated) DEVICE — PENCIL ELECTROSURG HOLST W/BLD

## (undated) DEVICE — TROCAR ENDOPATH XCEL 5X100MM

## (undated) DEVICE — NDL HYPO REG 25G X 1 1/2

## (undated) DEVICE — BLADE SURG STAINLESS STEEL #15

## (undated) DEVICE — GLOVE SENSICARE PI SURG 7.5

## (undated) DEVICE — Device

## (undated) DEVICE — SUT VICRYL PLUS 4-0 PS2 27

## (undated) DEVICE — SYR 10CC LUER LOCK

## (undated) DEVICE — SYS SEE SHARP SCP ANTIFG LNG

## (undated) DEVICE — DRAPE UTILITY STRL 15X26IN

## (undated) DEVICE — CART STAPLE FLEX ETX 3.5MM BLU

## (undated) DEVICE — ADHESIVE MASTISOL VIAL 48/BX

## (undated) DEVICE — ELECTRODE BLD EXT INSUL 1

## (undated) DEVICE — SHEARS HARMONIC CRVD TIP 36CM

## (undated) DEVICE — BAG TISSUE RETRIEVAL 225ML

## (undated) DEVICE — TRAY CATH 1-LYR URIMTR 16FR